# Patient Record
Sex: MALE | Race: WHITE | NOT HISPANIC OR LATINO | Employment: OTHER | URBAN - METROPOLITAN AREA
[De-identification: names, ages, dates, MRNs, and addresses within clinical notes are randomized per-mention and may not be internally consistent; named-entity substitution may affect disease eponyms.]

---

## 2020-11-20 ENCOUNTER — TRANSCRIBE ORDERS (OUTPATIENT)
Dept: ADMINISTRATIVE | Facility: HOSPITAL | Age: 65
End: 2020-11-20

## 2020-11-20 DIAGNOSIS — K21.9 GASTRO-ESOPHAGEAL REFLUX DISEASE WITHOUT ESOPHAGITIS: ICD-10-CM

## 2020-11-20 DIAGNOSIS — R49.9 UNSPECIFIED VOICE AND RESONANCE DISORDER: Primary | ICD-10-CM

## 2020-11-30 ENCOUNTER — HOSPITAL ENCOUNTER (OUTPATIENT)
Dept: RADIOLOGY | Facility: HOSPITAL | Age: 65
Discharge: HOME | End: 2020-11-30
Attending: SPECIALIST
Payer: MEDICARE

## 2020-11-30 DIAGNOSIS — R49.9 UNSPECIFIED VOICE AND RESONANCE DISORDER: ICD-10-CM

## 2020-11-30 DIAGNOSIS — K21.9 GASTRO-ESOPHAGEAL REFLUX DISEASE WITHOUT ESOPHAGITIS: ICD-10-CM

## 2020-11-30 PROCEDURE — 74220 X-RAY XM ESOPHAGUS 1CNTRST: CPT

## 2021-03-04 DIAGNOSIS — Z23 ENCOUNTER FOR IMMUNIZATION: ICD-10-CM

## 2021-07-08 ENCOUNTER — TELEPHONE (OUTPATIENT)
Dept: CT IMAGING | Facility: HOSPITAL | Age: 66
End: 2021-07-08

## 2021-07-08 ENCOUNTER — HOSPITAL ENCOUNTER (EMERGENCY)
Facility: HOSPITAL | Age: 66
Discharge: HOME | End: 2021-07-08
Attending: EMERGENCY MEDICINE | Admitting: EMERGENCY MEDICINE
Payer: MEDICARE

## 2021-07-08 ENCOUNTER — APPOINTMENT (EMERGENCY)
Dept: CT IMAGING | Facility: HOSPITAL | Age: 66
End: 2021-07-08
Payer: MEDICARE

## 2021-07-08 VITALS
OXYGEN SATURATION: 98 % | HEART RATE: 62 BPM | DIASTOLIC BLOOD PRESSURE: 80 MMHG | TEMPERATURE: 98 F | SYSTOLIC BLOOD PRESSURE: 160 MMHG | RESPIRATION RATE: 18 BRPM

## 2021-07-08 DIAGNOSIS — S01.81XA FACIAL LACERATION, INITIAL ENCOUNTER: ICD-10-CM

## 2021-07-08 DIAGNOSIS — S05.12XA PERIORBITAL CONTUSION OF LEFT EYE, INITIAL ENCOUNTER: Primary | ICD-10-CM

## 2021-07-08 PROCEDURE — G1004 CDSM NDSC: HCPCS

## 2021-07-08 PROCEDURE — 99282 EMERGENCY DEPT VISIT SF MDM: CPT | Performed by: PHYSICIAN ASSISTANT

## 2021-07-08 PROCEDURE — 12013 RPR F/E/E/N/L/M 2.6-5.0 CM: CPT | Performed by: PHYSICIAN ASSISTANT

## 2021-07-08 PROCEDURE — 99284 EMERGENCY DEPT VISIT MOD MDM: CPT

## 2021-07-08 PROCEDURE — 70486 CT MAXILLOFACIAL W/O DYE: CPT

## 2021-07-08 RX ORDER — LIDOCAINE HYDROCHLORIDE AND EPINEPHRINE 10; 10 MG/ML; UG/ML
10 INJECTION, SOLUTION INFILTRATION; PERINEURAL ONCE
Status: COMPLETED | OUTPATIENT
Start: 2021-07-08 | End: 2021-07-08

## 2021-07-08 RX ADMIN — LIDOCAINE HYDROCHLORIDE,EPINEPHRINE BITARTRATE 10 ML: 10; .01 INJECTION, SOLUTION INFILTRATION; PERINEURAL at 18:43

## 2021-07-08 NOTE — ED PROVIDER NOTES
History  Chief Complaint   Patient presents with    Fall     pt slipped in shower this morning hitting left eyebrow on vanity  denies LOC  does not take asa or BT     Yessi Gonsalves is a 76 y o  male who presents to the ED with complaints of facial laceration  Patient was out of town this morning when he slipped in the shower and hit his left forehead off of a piece of furniture at 0400 this morning  Patient states he had a catch a flight so he applied peroxide, a Band-Aid and Neosporin  Patient states during the injury he felt like one of his left upper teeth came loose  Patient states he had a recent root canal so he called his dentist  Patient admits to mild left-sided headache  Denies LOC, nausea, vomiting, neck pain, neck stiffness, numbness, tingling, weakness, visual changes, pain with extraocular movement, diplopia, epistaxis, chest pain, shortness of breath, fever, chills  History provided by:  Patient  Fall  Mechanism of injury: fall    Injury location:  Head/neck  Head/neck injury location:  Head  Time since incident:  14 hours  Fall:     Fall occurred:  Tripped    Impact surface:  Furniture  Associated symptoms: headaches    Associated symptoms: no abdominal pain, no back pain, no blindness, no chest pain, no nausea, no neck pain, no seizures and no vomiting        None       History reviewed  No pertinent past medical history  History reviewed  No pertinent surgical history  History reviewed  No pertinent family history  I have reviewed and agree with the history as documented  E-Cigarette/Vaping     E-Cigarette/Vaping Substances     Social History     Tobacco Use    Smoking status: Never Smoker    Smokeless tobacco: Never Used   Substance Use Topics    Alcohol use: Yes     Comment: 3 glasses of wine a day    Drug use: Not Currently       Review of Systems   Constitutional: Negative for appetite change, chills, fever and unexpected weight change     HENT: Negative for congestion, drooling, ear pain, rhinorrhea, sore throat, trouble swallowing and voice change  Eyes: Negative for blindness, pain, discharge, redness and visual disturbance  Respiratory: Negative for cough, shortness of breath, wheezing and stridor  Cardiovascular: Negative for chest pain, palpitations and leg swelling  Gastrointestinal: Negative for abdominal pain, blood in stool, constipation, diarrhea, nausea and vomiting  Genitourinary: Negative for dysuria, flank pain, frequency, hematuria and urgency  Musculoskeletal: Negative for back pain, gait problem, joint swelling, neck pain and neck stiffness  Skin: Positive for wound  Negative for color change and rash  Neurological: Positive for headaches  Negative for dizziness, seizures and light-headedness  Physical Exam  Physical Exam  Vitals and nursing note reviewed  Constitutional:       Appearance: He is well-developed  HENT:      Head: Normocephalic and atraumatic  Comments: Left periorbital contusion  3 cm laceration overlying the left forehead/eyebrow  No appreciable foreign body during irrigation or expiration  No skull depression  Nose: Nose normal    Eyes:      Conjunctiva/sclera: Conjunctivae normal       Pupils: Pupils are equal, round, and reactive to light  Cardiovascular:      Rate and Rhythm: Normal rate and regular rhythm  Pulmonary:      Effort: Pulmonary effort is normal       Breath sounds: Normal breath sounds  Musculoskeletal:         General: Normal range of motion  Cervical back: Normal range of motion and neck supple  Skin:     General: Skin is warm and dry  Capillary Refill: Capillary refill takes less than 2 seconds  Neurological:      Mental Status: He is alert and oriented to person, place, and time           Vital Signs  ED Triage Vitals   Temperature Pulse Respirations Blood Pressure SpO2   07/08/21 1706 07/08/21 1704 07/08/21 1704 07/08/21 1705 07/08/21 1704   98 1 °F (36 7 °C) 60 18 (!) 210/109 98 %      Temp Source Heart Rate Source Patient Position - Orthostatic VS BP Location FiO2 (%)   07/08/21 1706 07/08/21 1704 07/08/21 1922 -- --   Oral Monitor Lying        Pain Score       --                  Vitals:    07/08/21 1704 07/08/21 1705 07/08/21 1855 07/08/21 1922   BP:  (!) 210/109 160/80 160/80   Pulse: 60  62 62   Patient Position - Orthostatic VS:    Lying         Visual Acuity  Visual Acuity      Most Recent Value   L Pupil Size (mm)  3   R Pupil Size (mm)  3          ED Medications  Medications   lidocaine-epinephrine (XYLOCAINE/EPINEPHRINE) 1 %-1:100,000 injection 10 mL (10 mL Infiltration Given 7/8/21 1843)       Diagnostic Studies  Results Reviewed     None                 CT facial bones without contrast   Final Result by Oliver Cottrell MD (07/08 2008)         1  No evidence of acute maxillofacial fracture  2   Left periorbital soft tissue contusion  No retrobulbar hematoma  Workstation performed: [id number]                    Procedures  Laceration repair    Date/Time: 7/8/2021 8:32 PM  Performed by: Gabriel Valdez MD  Authorized by: Amy Gutierrez DO   Consent given by: patient  Body area: head/neck  Location details: forehead  Laceration length: 3 cm    Anesthesia:  Local Anesthetic: lidocaine 1% with epinephrine  Anesthetic total: 5 mL    Wound Dehiscence:  Superficial Wound Dehiscence: simple closure      Procedure Details:  Preparation: Patient was prepped and draped in the usual sterile fashion    Irrigation solution: saline  Irrigation method: jet lavage  Amount of cleaning: standard  Skin closure: 5-0 nylon  Number of sutures: 5  Technique: buried suture  Approximation: close  Approximation difficulty: simple  Patient tolerance: patient tolerated the procedure well with no immediate complications  Comments: 4 simple interrupted, 1 figure of eight                ED Course  ED Course as of Jul 08 2044   Thu Jul 08, 2021 2029 Educated patient regarding diagnosis and management  Advised patient to follow up with PCP  Advised patient to RTER for persistent or worsening symptoms  SBIRT 22yo+      Most Recent Value   SBIRT (24 yo +)   In order to provide better care to our patients, we are screening all of our patients for alcohol and drug use  Would it be okay to ask you these screening questions? Unable to answer at this time Filed at: 07/08/2021 1922                    [facility]  Number of Diagnoses or Management Options  Facial laceration, initial encounter: new and requires workup  Periorbital contusion of left eye, initial encounter: new and requires workup  Diagnosis management comments: CT facial bones without acute maxillofacial fracture but significant for left periorbital soft tissue contusion  Patient tolerated wound irrigation and closure  Patient was instructed on proper wound care  Patient will follow up in 5-7 days for suture removal  I provided patient with strict RTER precautions  I advised patient follow-up with PCP in 24-48 hours  Patient verbalized understanding  Amount and/or Complexity of Data Reviewed  Tests in the radiology section of CPT®: ordered and reviewed  Review and summarize past medical records: yes    Patient Progress  Patient progress: stable      Disposition  Final diagnoses:   Periorbital contusion of left eye, initial encounter   Facial laceration, initial encounter     Time reflects when diagnosis was documented in both MDM as applicable and the Disposition within this note     Time User Action Codes Description Comment    7/8/2021  8:30  [address], [address] [[id number]] Periorbital contusion of left eye, initial encounter     7/8/2021  8:31 PM Lizzie Quinonez Add [[id number]] Facial laceration, initial encounter       ED Disposition     ED Disposition Condition Date/Time Comment    Discharge Stable Thu Jul 8, 2021  8:31 PM Carl Miller discharge to home/self care  Follow-up Information     Follow up With Specialties Details Why Contact Info Additional [address] Emergency Department Emergency Medicine Go to  For suture removal in 5-7 days [address] [facility] [address] [facility] Emergency Department, [address], [place], [place], [address] [facility] to  For suture removal in 5-7 days [address] [facility]  [phone]             There are no discharge medications for this patient  No discharge procedures on file      PDMP Review     None          ED Provider  Electronically Signed by           Lida Denise PA-C  07/08/21 4663

## 2021-07-09 NOTE — DISCHARGE INSTRUCTIONS
Laceration   WHAT YOU NEED TO KNOW:   A laceration is an injury to the skin and the soft tissue underneath it  Lacerations can happen anywhere on the body  DISCHARGE INSTRUCTIONS:   Return to the emergency department if:   · You have heavy bleeding or bleeding that does not stop after 10 minutes of holding firm, direct pressure over the wound  · Your wound opens up  Call your doctor if:   · You have a fever or chills  · Your laceration is red, warm, or swollen  · You have red streaks on your skin coming from your wound  · You have white or yellow drainage from the wound that smells bad  · You have pain that gets worse, even after treatment  · You have questions or concerns about your condition or care  Medicines: You may need any of the following:  · Prescription pain medicine  may be given  Ask your healthcare provider how to take this medicine safely  Some prescription pain medicines contain acetaminophen  Do not take other medicines that contain acetaminophen without talking to your healthcare provider  Too much acetaminophen may cause liver damage  Prescription pain medicine may cause constipation  Ask your healthcare provider how to prevent or treat constipation  · Antibiotics  help treat or prevent a bacterial infection  · Take your medicine as directed  Contact your healthcare provider if you think your medicine is not helping or if you have side effects  Tell him or her if you are allergic to any medicine  Keep a list of the medicines, vitamins, and herbs you take  Include the amounts, and when and why you take them  Bring the list or the pill bottles to follow-up visits  Carry your medicine list with you in case of an emergency  Care for your wound as directed:   · Do not get your wound wet  until your healthcare provider says it is okay  Do not soak your wound in water  Do not go swimming until your healthcare provider says it is okay   Carefully wash the wound with soap and water  Gently pat the area dry or allow it to air dry  · Change your bandages  when they get wet, dirty, or after washing  Apply new, clean bandages as directed  Do not apply elastic bandages or tape too tight  Do not put powders or lotions over your incision  · Apply antibiotic ointment as directed  Your healthcare provider may give you antibiotic ointment to put over your wound if you have stitches  If you have strips of tape over your incision, let them dry up and fall off on their own  If they do not fall off within 14 days, gently remove them  If you have glue over your wound, do not remove or pick at it  If your glue comes off, do not replace it with glue that you have at home  · Check your wound every day for signs of infection, such as swelling, redness, or pus  Self-care:   · Apply ice  on your wound for 15 to 20 minutes every hour or as directed  Use an ice pack, or put crushed ice in a plastic bag  Cover it with a towel  Ice helps prevent tissue damage and decreases swelling and pain  · Use a splint as directed  A splint will decrease movement and stress on your wound  It may help it heal faster  A splint may be used for lacerations over joints or areas of your body that bend  Ask your healthcare provider how to apply and remove a splint  · Decrease scarring of your wound  by applying ointments as directed  Do not apply ointments until your healthcare provider says it is okay  You may need to wait until your wound is healed  Ask which ointment to buy and how often to use it  After your wound is healed, use sunscreen over the area when you are out in the sun  You should do this for at least 6 months to 1 year after your injury  Follow up with your doctor as directed: You may need to follow up in 24 to 48 hours to have your wound checked for infection  You will need to return in 3 to 14 days if you have stitches or staples so they can be removed   Care for your wound as directed to prevent infection and help it heal  Write down your questions so you remember to ask them during your visits  © Copyright [address] Information is for End User's use only and may not be sold, redistributed or otherwise used for commercial purposes  All illustrations and images included in CareNotes® are the copyrighted property of [employer]  or Reynaldo Silver[employer]  The above information is an  only  It is not intended as medical advice for individual conditions or treatments  Talk to your doctor, nurse or pharmacist before following any medical regimen to see if it is safe and effective for you  Care For Your Stitches   WHAT YOU NEED TO KNOW:   Stitches, or sutures, are used to close cuts and wounds on the skin  Stitches need to be removed after your wound has healed  DISCHARGE INSTRUCTIONS:   Return to the emergency department if:   · Your stitches come apart  · Blood soaks through your bandages  · You suddenly cannot move your injured joint  · You have sudden numbness around your wound  · You see red streaks coming from your wound  Contact your healthcare provider if:   · You have a fever and chills  · Your wound is red, warm, swollen, or leaking pus  · There is a bad smell coming from your wound  · You have increased pain in the wound area  · You have questions or concerns about your condition or care  Care for your stitches:   · Protect the stitches  You may need to cover your stitches with a bandage for 24 to 48 hours, or as directed  Do not bump or hit the suture area  This could open the wound  Do not trim or shorten the ends of your stitches  If they rub on your clothing, put a gauze bandage between the stitches and your clothes  · Clean the area as directed  Carefully wash your wound with soap and water  For mouth and lip wounds, rinse your mouth after meals and at bedtime   Ask your healthcare provider what to use to rinse your mouth  If you have a scalp wound, you may gently wash your hair every 2 days with mild shampoo  Do not use hair products, such as hair spray  Check your wound for signs of infection when you clean it  Signs include redness, swelling, and pus  · Keep the area dry as directed  Wait 12 to 24 hours after you receive your stitches before you take a shower  Take showers instead of baths  Do not take a bath or swim  Your healthcare provider will give you instructions for bathing with your stitches  Help your wound heal:   · Elevate your wound  Keep your wound above the level of your heart as often as you can  This will help decrease swelling and pain  Prop the area on pillows or blankets, if possible, to keep it elevated comfortably  · Limit activity  Do not stretch the skin around your wound  This will help prevent bleeding and swelling  Follow up with your healthcare provider as directed: You may need to return to have your stitches removed  Write down your questions so you remember to ask them during your visits  © Copyright [address] Information is for End User's use only and may not be sold, redistributed or otherwise used for commercial purposes  All illustrations and images included in CareNotes® are the copyrighted property of [employer] A M [employer] Inc  or [address] [place], [place] [address]ger   The above information is an  only  It is not intended as medical advice for individual conditions or treatments  Talk to your doctor, nurse or pharmacist before following any medical regimen to see if it is safe and effective for you

## 2023-08-16 ENCOUNTER — APPOINTMENT (OUTPATIENT)
Dept: RADIOLOGY | Facility: CLINIC | Age: 68
End: 2023-08-16
Payer: MEDICARE

## 2023-08-16 VITALS — SYSTOLIC BLOOD PRESSURE: 118 MMHG | DIASTOLIC BLOOD PRESSURE: 78 MMHG

## 2023-08-16 DIAGNOSIS — M25.521 PAIN IN RIGHT ELBOW: ICD-10-CM

## 2023-08-16 DIAGNOSIS — M25.521 PAIN IN RIGHT ELBOW: Primary | ICD-10-CM

## 2023-08-16 PROCEDURE — 73080 X-RAY EXAM OF ELBOW: CPT

## 2023-08-16 PROCEDURE — 99204 OFFICE O/P NEW MOD 45 MIN: CPT | Performed by: FAMILY MEDICINE

## 2023-08-16 RX ORDER — FINASTERIDE 5 MG/1
TABLET, FILM COATED ORAL
COMMUNITY
Start: 2023-06-19

## 2023-08-16 RX ORDER — LISINOPRIL 10 MG/1
TABLET ORAL
COMMUNITY
Start: 2023-08-15

## 2023-08-16 NOTE — PROGRESS NOTES
1. Pain in right elbow  XR elbow 3+ vw right    CT upper extremity wo contrast right    [facility]        Orders Placed This Encounter   Procedures   • XR elbow 3+ vw right   • CT upper extremity wo contrast right   • [facility]        IMAGING STUDIES: (I personally reviewed images in PACS and report):  xray right elbow 8/16/23: Moderate OA. 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 Patient instructions below verbally summarized in person during encounter: There are no Patient Instructions on file for this visit.      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 Currently deep seated pain. Denies any pain of epicondyles. Developed new onset stiffness of right elbow. Review of Systems      Following history reviewed and update:    History reviewed. No pertinent past medical history. History reviewed. No pertinent surgical history. Social History   Social History     Substance and Sexual Activity   Alcohol Use Yes    Comment: 3 glasses of wine a day     Social History     Substance and Sexual Activity   Drug Use Not Currently     Social History     Tobacco Use   Smoking Status Never   Smokeless Tobacco Never     History reviewed. No pertinent family history.   No Known Allergies       Physical Exam  /78     Constitutional:  see vital signs  Gen: well-developed, normocephalic/atraumatic, well-groomed  Eyes: No inflammation or discharge of conjunctiva or lids; sclera clear   Pharynx: no inflammation, lesion, or mass of lips  Neck: supple, no masses, non-distended  MSK: no inflammation, lesion, mass, or clubbing of nails and digits except for other than mentioned below  SKIN: no visible rashes or skin lesions  Pulmonary/Chest: Effort normal. No respiratory distress.    NEURO: cranial nerves grossly intact  PSYCH:  Alert and oriented to person, place, and time; recent and remote memory intact; mood normal, no depression, anxiety, or agitation, judgment and insight good and intact     Ortho Exam  Right Elbow:  no swelling, erythema, or increased warmth  rom full flexion; minus 20-30 extension  nontender  no laxity of joint  Cubital tunnel Tinel's test: negative  Distal Biceps Hook test: normal    Right Wrist  no swelling, erythema, or increased warmth  rom full  nontender  no laxity of joint; druj stable    Right Hand  no erythema  Swelling: none  Tenderness: none  rom fingers mcp, pip, dip intact without pain  No digital scissoring or deviation of fingers  no extensor lag  no rotation of fingers  no joint laxity  strenght flexion and extension mcp, pip, dip 5/5  sensation intact  capillary refill intact   Froment sign:  normal  OK sign:  Normal  Thumb extension:  5/5    __________________________________________________________________________  Procedures

## 2023-08-23 ENCOUNTER — HOSPITAL ENCOUNTER (OUTPATIENT)
Dept: CT IMAGING | Facility: HOSPITAL | Age: 68
Discharge: HOME | End: 2023-08-23
Attending: FAMILY MEDICINE
Payer: MEDICARE

## 2023-08-23 DIAGNOSIS — M25.521 PAIN IN RIGHT ELBOW: ICD-10-CM

## 2023-08-23 PROCEDURE — G1004 CDSM NDSC: HCPCS

## 2023-08-23 PROCEDURE — 73200 CT UPPER EXTREMITY W/O DYE: CPT

## 2023-08-31 ENCOUNTER — APPOINTMENT (OUTPATIENT)
Dept: RADIOLOGY | Facility: OTHER | Age: 68
End: 2023-08-31
Payer: MEDICARE

## 2023-08-31 VITALS — DIASTOLIC BLOOD PRESSURE: 90 MMHG | SYSTOLIC BLOOD PRESSURE: 149 MMHG | HEART RATE: 54 BPM | WEIGHT: 216 LBS

## 2023-08-31 DIAGNOSIS — G89.29 CHRONIC PAIN OF BOTH KNEES: ICD-10-CM

## 2023-08-31 DIAGNOSIS — M25.561 CHRONIC PAIN OF BOTH KNEES: ICD-10-CM

## 2023-08-31 DIAGNOSIS — M25.562 CHRONIC PAIN OF BOTH KNEES: ICD-10-CM

## 2023-08-31 DIAGNOSIS — M19.021 PRIMARY OSTEOARTHRITIS OF RIGHT ELBOW: Primary | ICD-10-CM

## 2023-08-31 PROCEDURE — 73562 X-RAY EXAM OF KNEE 3: CPT

## 2023-08-31 RX ORDER — BUPIVACAINE HYDROCHLORIDE 2.5 MG/ML
4 INJECTION, SOLUTION INFILTRATION; PERINEURAL
Status: COMPLETED | OUTPATIENT
Start: 2023-08-31 | End: 2023-08-31

## 2023-08-31 RX ORDER — TRIAMCINOLONE ACETONIDE 40 MG/ML
40 INJECTION, SUSPENSION INTRA-ARTICULAR; INTRAMUSCULAR
Status: COMPLETED | OUTPATIENT
Start: 2023-08-31 | End: 2023-08-31

## 2023-08-31 RX ORDER — LISINOPRIL 10 MG/1
1 TABLET ORAL DAILY
COMMUNITY
Start: 2022-10-01 | End: 2023-09-05 | Stop reason: SDUPTHER

## 2023-08-31 RX ADMIN — TRIAMCINOLONE ACETONIDE 40 MG: 40 INJECTION, SUSPENSION INTRA-ARTICULAR; INTRAMUSCULAR at 08:15

## 2023-08-31 RX ADMIN — BUPIVACAINE HYDROCHLORIDE 4 ML: 2.5 INJECTION, SOLUTION INFILTRATION; PERINEURAL at 08:15

## 2023-08-31 NOTE — PROGRESS NOTES
1. Primary osteoarthritis of right elbow    2. Chronic pain of both knees        Orders Placed This Encounter   Procedures   • Large joint arthrocentesis: R knee   • Large joint arthrocentesis: L knee   • XR knee 3 vw right non injury   • XR knee 3 vw left non injury   • Ambulatory Referral to Occupational Therapy   • Ambulatory Referral to Physical Therapy        IMAGING STUDIES: (I personally reviewed images in PACS and report):    XR bilateral knee 8/31/2023  Mild-moderate lateral compartment OA  No acute fracture or dislocation       PAST REPORTS:    CT right upper extremity 8/23/2023  There is moderate elbow osteoarthritis  There is a punctate calcified intra-articular body located between the ulna and trochlea  There are also several small calcified intra-articular bodies in the olecranon fossa  There is a small joint effusion    XR right elbow dated 8/16/2023  Moderate OA  Enthesophytes at the medial and lateral epicondyles at the common flexor and extensor tendon origins, likely sequela of chronic medial and lateral epicondylitis. PAST PROCEDURES:      ASSESSMENT/PLAN:  Right Elbow Stiffness with flexion contracture due to OA. Mild-moderate OA of bilateral knee      Repeat X-ray next visit: None    Return for Follow-up for Ultrasound Guided Injection at [facility]. Patient instructions below verbally summarized in person during encounter:  Patient Instructions   red flags and risks of injection include but are not limited to infection <0.072% as referenced in some sources, nerve or artery penetration, and if steroids are used-skin dimpling <1%, hypo-pigmentation <1%. Recommended no submerging underwater in a tub, pool, ocean, lake, jacuzzi, hot tub, or any other body of water for 1 week until needle wound closes due to risk of infection. May take showers. Clean needle site with soap and water and keep covered at all times with sterile bandage such as a band-aid until fully healed. Educated if any symptoms including fevers, chills, swelling, or worsening symptoms occur then to call office or go to hospital for immediate care if physician unavailable due to possible infection or other complication which is a serious medical problem. Patient expressed understanding and agreed to proceed with procedure.          __________________________________________________________________________    HISTORY OF PRESENT ILLNESS:    21-year-old male presents for 2 weeks follow-up of non-traumatic right elbow pain. PMHX chronic intermittent medial and lateral epicondylitis of right elbow associated with playing tennis. Also fell 1 year ago with right shoulder and elbow pain. Last visit 8/16/2023 with noted right elbow stiffness with flexion contracture due to OA. Today patient reports his pain has improved approx. 50% since last visit. Still endorses pain deep seated posterior elbow pain. Pain is 7/10 at worst, localized, dull/achy, worse with extreme of ROM, improved with rest. Stiffness has been stable. Also reports chronic intermittent bilateral lateral knee pain. Denies any injury or trauma. Review of Systems      Following history reviewed and update:    History reviewed. No pertinent past medical history. History reviewed. No pertinent surgical history. Social History   Social History     Substance and Sexual Activity   Alcohol Use Yes    Comment: 3 glasses of wine a day     Social History     Substance and Sexual Activity   Drug Use Not Currently     Social History     Tobacco Use   Smoking Status Never   Smokeless Tobacco Never     History reviewed. No pertinent family history.   No Known Allergies       Physical Exam  /90   Pulse (!) 54   Wt 98 kg (216 lb)     Constitutional:  see vital signs  Gen: well-developed, normocephalic/atraumatic, well-groomed  Eyes: No inflammation or discharge of conjunctiva or lids; sclera clear   Pharynx: no inflammation, lesion, or mass of lips  Neck: supple, no masses, non-distended  MSK: no inflammation, lesion, mass, or clubbing of nails and digits except for other than mentioned below  SKIN: no visible rashes or skin lesions  Pulmonary/Chest: Effort normal. No respiratory distress. NEURO: cranial nerves grossly intact  PSYCH:  Alert and oriented to person, place, and time; recent and remote memory intact; mood normal, no depression, anxiety, or agitation, judgment and insight good and intact     Ortho Exam      Right Elbow:  no swelling, erythema, or increased warmth  rom full flexion; minus 20-30 extension  nontender  no laxity of joint  Cubital tunnel Tinel's test: negative  Distal Biceps Hook test: normal     Right Wrist  no swelling, erythema, or increased warmth  rom full  nontender  no laxity of joint; druj stable     Right Hand  no erythema  Swelling: none  Tenderness: none  rom fingers mcp, pip, dip intact without pain  No digital scissoring or deviation of fingers  no extensor lag  no rotation of fingers  no joint laxity  strenght flexion and extension mcp, pip, dip 5/5  sensation intact  capillary refill intact   Froment sign:  normal  OK sign:  Normal  Thumb extension:  5/5      __________________________________________________________________________  Large joint arthrocentesis: R knee  [place] Protocol:  Procedure performed by:  Consent: Verbal consent obtained. Risks and benefits: risks, benefits and alternatives were discussed  Consent given by: patient  Patient understanding: patient states understanding of the procedure being performed  Patient consent: the patient's understanding of the procedure matches consent given  Site marked: the operative site was marked  Radiology Images displayed and confirmed.  If images not available, report reviewed: imaging studies available  Required items: required blood products, implants, devices, and special equipment available  Patient identity confirmed: verbally with patient    Supporting Documentation  Indications: pain   Procedure Details  Location: knee - R knee  Preparation: Patient was prepped and draped in the usual sterile fashion  Needle size: 22 G (22G-1.5in needle)  Ultrasound guidance: no  Approach: anterolateral  Medications administered: 4 mL bupivacaine 0.25 %; 40 mg triamcinolone acetonide 40 mg/mL    Patient tolerance: patient tolerated the procedure well with no immediate complications  Dressing:  Sterile dressing applied    Large joint arthrocentesis: L knee  Universal Protocol:  Procedure performed by:  Consent: Verbal consent obtained. Risks and benefits: risks, benefits and alternatives were discussed  Consent given by: patient  Patient understanding: patient states understanding of the procedure being performed  Patient consent: the patient's understanding of the procedure matches consent given  Site marked: the operative site was marked  Radiology Images displayed and confirmed.  If images not available, report reviewed: imaging studies available  Required items: required blood products, implants, devices, and special equipment available  Patient identity confirmed: verbally with patient    Supporting Documentation  Indications: pain   Procedure Details  Location: knee - L knee  Preparation: Patient was prepped and draped in the usual sterile fashion  Needle size: 22 G (22G-1.5in needle)  Ultrasound guidance: no  Approach: anterolateral  Medications administered: 4 mL bupivacaine 0.25 %; 40 mg triamcinolone acetonide 40 mg/mL    Patient tolerance: patient tolerated the procedure well with no immediate complications  Dressing:  Sterile dressing applied

## 2023-09-05 ENCOUNTER — OFFICE VISIT (OUTPATIENT)
Dept: FAMILY MEDICINE CLINIC | Facility: CLINIC | Age: 68
End: 2023-09-05
Payer: MEDICARE

## 2023-09-05 ENCOUNTER — PATIENT MESSAGE (OUTPATIENT)
Dept: FAMILY MEDICINE CLINIC | Facility: CLINIC | Age: 68
End: 2023-09-05

## 2023-09-05 ENCOUNTER — LAB (OUTPATIENT)
Dept: LAB | Facility: CLINIC | Age: 68
End: 2023-09-05
Payer: MEDICARE

## 2023-09-05 VITALS
HEART RATE: 64 BPM | WEIGHT: 212 LBS | RESPIRATION RATE: 18 BRPM | DIASTOLIC BLOOD PRESSURE: 84 MMHG | HEIGHT: 71 IN | BODY MASS INDEX: 29.68 KG/M2 | OXYGEN SATURATION: 99 % | TEMPERATURE: 97.6 F | SYSTOLIC BLOOD PRESSURE: 150 MMHG

## 2023-09-05 DIAGNOSIS — Z00.00 MEDICARE ANNUAL WELLNESS VISIT, SUBSEQUENT: Primary | ICD-10-CM

## 2023-09-05 DIAGNOSIS — I10 PRIMARY HYPERTENSION: ICD-10-CM

## 2023-09-05 DIAGNOSIS — L65.9 HAIR LOSS: ICD-10-CM

## 2023-09-05 DIAGNOSIS — I10 PRIMARY HYPERTENSION: Primary | ICD-10-CM

## 2023-09-05 LAB
ANION GAP SERPL CALCULATED.3IONS-SCNC: 13 MMOL/L
BUN SERPL-MCNC: 19 MG/DL (ref 5–25)
CALCIUM SERPL-MCNC: 9.2 MG/DL (ref 8.4–10.2)
CHLORIDE SERPL-SCNC: 100 MMOL/L (ref 96–108)
CHOLEST SERPL-MCNC: 229 MG/DL
CO2 SERPL-SCNC: 25 MMOL/L (ref 21–32)
CREAT SERPL-MCNC: 1.19 MG/DL (ref 0.6–1.3)
GFR SERPL CREATININE-BSD FRML MDRD: 61 ML/MIN/1.73SQ M
GLUCOSE P FAST SERPL-MCNC: 98 MG/DL (ref 65–99)
HDLC SERPL-MCNC: 61 MG/DL
LDLC SERPL CALC-MCNC: 131 MG/DL (ref 0–100)
NONHDLC SERPL-MCNC: 168 MG/DL
POTASSIUM SERPL-SCNC: 4.4 MMOL/L (ref 3.5–5.3)
SODIUM SERPL-SCNC: 138 MMOL/L (ref 135–147)
TRIGL SERPL-MCNC: 184 MG/DL

## 2023-09-05 PROCEDURE — 99203 OFFICE O/P NEW LOW 30 MIN: CPT | Performed by: FAMILY MEDICINE

## 2023-09-05 PROCEDURE — 36415 COLL VENOUS BLD VENIPUNCTURE: CPT

## 2023-09-05 PROCEDURE — 80061 LIPID PANEL: CPT

## 2023-09-05 PROCEDURE — G0438 PPPS, INITIAL VISIT: HCPCS | Performed by: FAMILY MEDICINE

## 2023-09-05 PROCEDURE — 80048 BASIC METABOLIC PNL TOTAL CA: CPT

## 2023-09-05 RX ORDER — LISINOPRIL 10 MG/1
15 TABLET ORAL DAILY
Qty: 135 TABLET | Refills: 3 | Status: SHIPPED | OUTPATIENT
Start: 2023-09-05 | End: 2023-09-11

## 2023-09-05 RX ORDER — FINASTERIDE 5 MG/1
5 TABLET, FILM COATED ORAL DAILY
Qty: 90 TABLET | Refills: 3 | Status: SHIPPED | OUTPATIENT
Start: 2023-09-05

## 2023-09-05 NOTE — PATIENT INSTRUCTIONS
Medicare wellness exam was completed. Blood pressure is a bit high. Increase lisinopril to 15 mg daily. Check lipid profile, blood sugar and kidney function. Handout given about pros and cons of screening for prostate cancer from the choosing wisely program.  Vaccinations are up-to-date. Recommend the latest COVID vaccine when it comes out along with this years flu shot sometime in October. He was asked to call with the name of his colonoscopy doctor so we can get the information of his last colonoscopy. Could also make up some dates for vaccinations that can be placed in his medical record. Recheck 1 year or as needed. Medicare Preventive Visit Patient Instructions  Thank you for completing your Welcome to Medicare Visit or Medicare Annual Wellness Visit today. Your next wellness visit will be due in one year (9/5/2024). The screening/preventive services that you may require over the next 5-10 years are detailed below. Some tests may not apply to you based off risk factors and/or age. Screening tests ordered at today's visit but not completed yet may show as past due. Also, please note that scanned in results may not display below. Preventive Screenings:  Service Recommendations Previous Testing/Comments   Colorectal Cancer Screening  Colonoscopy    Fecal Occult Blood Test (FOBT)/Fecal Immunochemical Test (FIT)  Fecal DNA/Cologuard Test  Flexible Sigmoidoscopy Age: 43-73 years old   Colonoscopy: every 10 years (May be performed more frequently if at higher risk)  OR  FOBT/FIT: every 1 year  OR  Cologuard: every 3 years  OR  Sigmoidoscopy: every 5 years  Screening may be recommended earlier than age 39 if at higher risk for colorectal cancer. Also, an individualized decision between you and your healthcare provider will decide whether screening between the ages of 77-80 would be appropriate.  Colonoscopy: Not on file  FOBT/FIT: Not on file  Cologuard: Not on file  Sigmoidoscopy: Not on file Prostate Cancer Screening Individualized decision between patient and health care provider in men between ages of 53-66   Medicare will cover every 12 months beginning on the day after your 50th birthday PSA: No results in last 5 years           Hepatitis C Screening Once for adults born between 80 and 1965  More frequently in patients at high risk for Hepatitis C Hep C Antibody: Not on file        Diabetes Screening 1-2 times per year if you're at risk for diabetes or have pre-diabetes Fasting glucose: No results in last 5 years (No results in last 5 years)  A1C: No results in last 5 years (No results in last 5 years)      Cholesterol Screening Once every 5 years if you don't have a lipid disorder. May order more often based on risk factors. Lipid panel: Not on file         Other Preventive Screenings Covered by Medicare:  Abdominal Aortic Aneurysm (AAA) Screening: covered once if your at risk. You're considered to be at risk if you have a family history of AAA or a male between the age of 70-76 who smoking at least 100 cigarettes in your lifetime. Lung Cancer Screening: covers low dose CT scan once per year if you meet all of the following conditions: (1) Age 48-67; (2) No signs or symptoms of lung cancer; (3) Current smoker or have quit smoking within the last 15 years; (4) You have a tobacco smoking history of at least 20 pack years (packs per day x number of years you smoked); (5) You get a written order from a healthcare provider.   Glaucoma Screening: covered annually if you're considered high risk: (1) You have diabetes OR (2) Family history of glaucoma OR (3)  aged 48 and older OR (3)  American aged 72 and older  Osteoporosis Screening: covered every 2 years if you meet one of the following conditions: (1) Have a vertebral abnormality; (2) On glucocorticoid therapy for more than 3 months; (3) Have primary hyperparathyroidism; (4) On osteoporosis medications and need to assess response to drug therapy. HIV Screening: covered annually if you're between the age of 14-79. Also covered annually if you are younger than 13 and older than 72 with risk factors for HIV infection. For pregnant patients, it is covered up to 3 times per pregnancy. Immunizations:  Immunization Recommendations   Influenza Vaccine Annual influenza vaccination during flu season is recommended for all persons aged >= 6 months who do not have contraindications   Pneumococcal Vaccine   * Pneumococcal conjugate vaccine = PCV13 (Prevnar 13), PCV15 (Vaxneuvance), PCV20 (Prevnar 20)  * Pneumococcal polysaccharide vaccine = PPSV23 (Pneumovax) Adults 20-63 years old: 1-3 doses may be recommended based on certain risk factors  Adults 72 years old: 1-2 doses may be recommended based off what pneumonia vaccine you previously received   Hepatitis B Vaccine 3 dose series if at intermediate or high risk (ex: diabetes, end stage renal disease, liver disease)   Tetanus (Td) Vaccine - COST NOT COVERED BY MEDICARE PART B Following completion of primary series, a booster dose should be given every 10 years to maintain immunity against tetanus. Td may also be given as tetanus wound prophylaxis. Tdap Vaccine - COST NOT COVERED BY MEDICARE PART B Recommended at least once for all adults. For pregnant patients, recommended with each pregnancy. Shingles Vaccine (Shingrix) - COST NOT COVERED BY MEDICARE PART B  2 shot series recommended in those aged 48 and above     Health Maintenance Due:      Topic Date Due    Hepatitis C Screening  Never done    Colorectal Cancer Screening  Never done     Immunizations Due:      Topic Date Due    COVID-19 Vaccine (1) Never done    Pneumococcal Vaccine: 65+ Years (1 - PCV) Never done    Influenza Vaccine (1) 09/01/2023     Advance Directives   What are advance directives? Advance directives are legal documents that state your wishes and plans for medical care.  These plans are made ahead of time in case you lose your ability to make decisions for yourself. Advance directives can apply to any medical decision, such as the treatments you want, and if you want to donate organs. What are the types of advance directives? There are many types of advance directives, and each state has rules about how to use them. You may choose a combination of any of the following:  Living will: This is a written record of the treatment you want. You can also choose which treatments you do not want, which to limit, and which to stop at a certain time. This includes surgery, medicine, IV fluid, and tube feedings. Durable power of  for [facility]): This is a written record that states who you want to make healthcare choices for you when you are unable to make them for yourself. This person, called a proxy, is usually a family member or a friend. You may choose more than 1 proxy. Do not resuscitate (DNR) order:  A DNR order is used in case your heart stops beating or you stop breathing. It is a request not to have certain forms of treatment, such as CPR. A DNR order may be included in other types of advance directives. Medical directive: This covers the care that you want if you are in a coma, near death, or unable to make decisions for yourself. You can list the treatments you want for each condition. Treatment may include pain medicine, surgery, blood transfusions, dialysis, IV or tube feedings, and a ventilator (breathing machine). Values history: This document has questions about your views, beliefs, and how you feel and think about life. This information can help others choose the care that you would choose. Why are advance directives important? An advance directive helps you control your care. Although spoken wishes may be used, it is better to have your wishes written down. Spoken wishes can be misunderstood, or not followed. Treatments may be given even if you do not want them.  An advance directive may make it easier for your family to make difficult choices about your care. Weight Management   Why it is important to manage your weight:  Being overweight increases your risk of health conditions such as heart disease, high blood pressure, type 2 diabetes, and certain types of cancer. It can also increase your risk for osteoarthritis, sleep apnea, and other respiratory problems. Aim for a slow, steady weight loss. Even a small amount of weight loss can lower your risk of health problems. How to lose weight safely:  A safe and healthy way to lose weight is to eat fewer calories and get regular exercise. You can lose up about 1 pound a week by decreasing the number of calories you eat by 500 calories each day. Healthy meal plan for weight management:  A healthy meal plan includes a variety of foods, contains fewer calories, and helps you stay healthy. A healthy meal plan includes the following:  Eat whole-grain foods more often. A healthy meal plan should contain fiber. Fiber is the part of grains, fruits, and vegetables that is not broken down by your body. Whole-grain foods are healthy and provide extra fiber in your diet. Some examples of whole-grain foods are whole-wheat breads and pastas, oatmeal, brown rice, and bulgur. Eat a variety of vegetables every day. Include dark, leafy greens such as spinach, kale, [place] greens, and mustard greens. Eat yellow and orange vegetables such as carrots, sweet potatoes, and winter squash. Eat a variety of fruits every day. Choose fresh or canned fruit (canned in its own juice or light syrup) instead of juice. Fruit juice has very little or no fiber. Eat low-fat dairy foods. Drink fat-free (skim) milk or 1% milk. Eat fat-free yogurt and low-fat cottage cheese. Try low-fat cheeses such as mozzarella and other reduced-fat cheeses. Choose meat and other protein foods that are low in fat. Choose beans or other legumes such as split peas or lentils.  Choose fish, skinless poultry (chicken or turkey), or lean cuts of red meat (beef or pork). Before you cook meat or poultry, cut off any visible fat. Use less fat and oil. Try baking foods instead of frying them. Add less fat, such as margarine, sour cream, regular salad dressing and mayonnaise to foods. Eat fewer high-fat foods. Some examples of high-fat foods include french fries, doughnuts, ice cream, and cakes. Eat fewer sweets. Limit foods and drinks that are high in sugar. This includes candy, cookies, regular soda, and sweetened drinks. Exercise:  Exercise at least 30 minutes per day on most days of the week. Some examples of exercise include walking, biking, dancing, and swimming. You can also fit in more physical activity by taking the stairs instead of the elevator or parking farther away from stores. Ask your healthcare provider about the best exercise plan for you. © Copyright [employer] 2018 Information is for End User's use only and may not be sold, redistributed or otherwise used for commercial purposes.  All illustrations and images included in CareNotes® are the copyrighted property of [employer].A[employer]M., Inc. or  [address]

## 2023-09-05 NOTE — RESULT ENCOUNTER NOTE
Cholesterol is okay. High amount of the protective HDL.   Blood sugar and kidney function are normal.

## 2023-09-05 NOTE — PROGRESS NOTES
Chief Complaint   Patient presents with   • Well Check     Physical         HPI   69-year-old male presents as a new patient. Past medical history significant for hypertension on 10 mg of lisinopril daily. He uses Propecia to prevent hair loss. No other chronic medical problems. History is negative for coronary artery disease, diabetes, hyperlipidemia, and cancer. Non-smoker. Drinks wine every day. No allergies to medications. Surgery done 24 years ago for recurrent GI bleeding included pyloroplasty and vagotomy. No problems since that time. Colonoscopy within the last 4 to 5 years done in [place]. As far as vaccinations, had Zostavax followed by 2 Shingrix shots. Has had pneumococcal vaccine and 6 COVID vaccines. Past Medical History:   Diagnosis Date   • Hypertension         Past Surgical History:   Procedure Laterality Date   • VAGOTOMY AND PYLOROPLASTY N/A 1999    Done after multiple GI bleeds with good success       Social History     Tobacco Use   • Smoking status: Never   • Smokeless tobacco: Never   Substance Use Topics   • Alcohol use: Yes     Comment: 3 glasses of wine a day       Social History     Social History Narrative     since 1987.    2 children. 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 Wife is an author. Plays tennis 5 days a week. Lives in [place] and has a vacation house in [place] which became primary residence in [facility]. Goes to [facility] for 4 months every winter. The following portions of the patient's history were reviewed and updated as appropriate: allergies, current medications, past family history, past medical history, past social history, past surgical history and problem list.      Review of Systems       /84   Pulse 64   Temp 97.6 °F (36.4 °C)   Resp 18   Ht 5' 11" (1.803 m)   Wt 96.2 kg (212 lb)   SpO2 99%   BMI 29.57 kg/m²      Physical Exam   Appears well. Repeat blood pressure 150/100.   No neck nodes.  Lungs are clear. Heart regular with no murmur. Abdomen soft and nontender. No edema. Mood upbeat. Affect appropriate. Current Outpatient Medications:   •  finasteride (PROSCAR) 5 mg tablet, , Disp: , Rfl:   •  lisinopril (ZESTRIL) 10 mg tablet, Take 1 tablet by mouth daily, Disp: , Rfl:      No problem-specific Assessment & Plan notes found for this encounter. Diagnoses and all orders for this visit:    Medicare annual wellness visit, subsequent    Primary hypertension    Hair loss        Patient Instructions     Medicare wellness exam was completed. Blood pressure is a bit high. Increase lisinopril to 15 mg daily. Check lipid profile, blood sugar and kidney function. Handout given about pros and cons of screening for prostate cancer from the choosing wisely program.  Vaccinations are up-to-date. Recommend the latest COVID vaccine when it comes out along with this years flu shot sometime in October. He was asked to call with the name of his colonoscopy doctor so we can get the information of his last colonoscopy. Could also make up some dates for vaccinations that can be placed in his medical record. Recheck 1 year or as needed. Medicare Preventive Visit Patient Instructions  Thank you for completing your Welcome to Medicare Visit or Medicare Annual Wellness Visit today. Your next wellness visit will be due in one year (9/5/2024). The screening/preventive services that you may require over the next 5-10 years are detailed below. Some tests may not apply to you based off risk factors and/or age. Screening tests ordered at today's visit but not completed yet may show as past due. Also, please note that scanned in results may not display below.   Preventive Screenings:  Service Recommendations Previous Testing/Comments   Colorectal Cancer Screening  · Colonoscopy    · Fecal Occult Blood Test (FOBT)/Fecal Immunochemical Test (FIT)  · Fecal DNA/Cologuard Test  · Flexible Sigmoidoscopy Age: 43-73 years old   Colonoscopy: every 10 years (May be performed more frequently if at higher risk)  OR  FOBT/FIT: every 1 year  OR  Cologuard: every 3 years  OR  Sigmoidoscopy: every 5 years  Screening may be recommended earlier than age 39 if at higher risk for colorectal cancer. Also, an individualized decision between you and your healthcare provider will decide whether screening between the ages of 77-80 would be appropriate. Colonoscopy: Not on file  FOBT/FIT: Not on file  Cologuard: Not on file  Sigmoidoscopy: Not on file          Prostate Cancer Screening Individualized decision between patient and health care provider in men between ages of 53-66   Medicare will cover every 12 months beginning on the day after your 50th birthday PSA: No results in last 5 years           Hepatitis C Screening Once for adults born between [address] [place] [address]  More frequently in patients at high risk for Hepatitis C Hep C Antibody: Not on file        Diabetes Screening 1-2 times per year if you're at risk for diabetes or have pre-diabetes Fasting glucose: No results in last 5 years (No results in last 5 years)  A1C: No results in last 5 years (No results in last 5 years)      Cholesterol Screening Once every 5 years if you don't have a lipid disorder. May order more often based on risk factors. Lipid panel: Not on file         Other Preventive Screenings Covered by Medicare:  1. Abdominal Aortic Aneurysm (AAA) Screening: covered once if your at risk. You're considered to be at risk if you have a family history of AAA or a male between the age of 70-76 who smoking at least 100 cigarettes in your lifetime.   2. Lung Cancer Screening: covers low dose CT scan once per year if you meet all of the following conditions: (1) Age 48-67; (2) No signs or symptoms of lung cancer; (3) Current smoker or have quit smoking within the last 15 years; (4) You have a tobacco smoking history of at least 20 pack years (packs per day x number of years you smoked); (5) You get a written order from a healthcare provider. 3. Glaucoma Screening: covered annually if you're considered high risk: (1) You have diabetes OR (2) Family history of glaucoma OR (3)  aged 48 and older OR (3)  American aged 72 and older  3. Osteoporosis Screening: covered every 2 years if you meet one of the following conditions: (1) Have a vertebral abnormality; (2) On glucocorticoid therapy for more than 3 months; (3) Have primary hyperparathyroidism; (4) On osteoporosis medications and need to assess response to drug therapy. 5. HIV Screening: covered annually if you're between the age of 14-79. Also covered annually if you are younger than 13 and older than 72 with risk factors for HIV infection. For pregnant patients, it is covered up to 3 times per pregnancy. Immunizations:  Immunization Recommendations   Influenza Vaccine Annual influenza vaccination during flu season is recommended for all persons aged >= 6 months who do not have contraindications   Pneumococcal Vaccine   * Pneumococcal conjugate vaccine = PCV13 (Prevnar 13), PCV15 (Vaxneuvance), PCV20 (Prevnar 20)  * Pneumococcal polysaccharide vaccine = PPSV23 (Pneumovax) Adults 20-63 years old: 1-3 doses may be recommended based on certain risk factors  Adults 72 years old: 1-2 doses may be recommended based off what pneumonia vaccine you previously received   Hepatitis B Vaccine 3 dose series if at intermediate or high risk (ex: diabetes, end stage renal disease, liver disease)   Tetanus (Td) Vaccine - COST NOT COVERED BY MEDICARE PART B Following completion of primary series, a booster dose should be given every 10 years to maintain immunity against tetanus. Td may also be given as tetanus wound prophylaxis. Tdap Vaccine - COST NOT COVERED BY MEDICARE PART B Recommended at least once for all adults. For pregnant patients, recommended with each pregnancy.    Shingles Vaccine (Shingrix) - COST NOT COVERED BY MEDICARE PART B  2 shot series recommended in those aged 48 and above     Health Maintenance Due:      Topic Date Due   • Hepatitis C Screening  Never done   • Colorectal Cancer Screening  Never done     Immunizations Due:      Topic Date Due   • COVID-19 Vaccine (1) Never done   • Pneumococcal Vaccine: 65+ Years (1 - PCV) Never done   • Influenza Vaccine (1) 09/01/2023     Advance Directives   What are advance directives? Advance directives are legal documents that state your wishes and plans for medical care. These plans are made ahead of time in case you lose your ability to make decisions for yourself. Advance directives can apply to any medical decision, such as the treatments you want, and if you want to donate organs. What are the types of advance directives? There are many types of advance directives, and each state has rules about how to use them. You may choose a combination of any of the following:  · Living will: This is a written record of the treatment you want. You can also choose which treatments you do not want, which to limit, and which to stop at a certain time. This includes surgery, medicine, IV fluid, and tube feedings. · Durable power of  for healthcare [facility]): This is a written record that states who you want to make healthcare choices for you when you are unable to make them for yourself. This person, called a proxy, is usually a family member or a friend. You may choose more than 1 proxy. · Do not resuscitate (DNR) order:  A DNR order is used in case your heart stops beating or you stop breathing. It is a request not to have certain forms of treatment, such as CPR. A DNR order may be included in other types of advance directives. · Medical directive: This covers the care that you want if you are in a coma, near death, or unable to make decisions for yourself. You can list the treatments you want for each condition.  Treatment may include pain medicine, surgery, blood transfusions, dialysis, IV or tube feedings, and a ventilator (breathing machine). · Values history: This document has questions about your views, beliefs, and how you feel and think about life. This information can help others choose the care that you would choose. Why are advance directives important? An advance directive helps you control your care. Although spoken wishes may be used, it is better to have your wishes written down. Spoken wishes can be misunderstood, or not followed. Treatments may be given even if you do not want them. An advance directive may make it easier for your family to make difficult choices about your care. Weight Management   Why it is important to manage your weight:  Being overweight increases your risk of health conditions such as heart disease, high blood pressure, type 2 diabetes, and certain types of cancer. It can also increase your risk for osteoarthritis, sleep apnea, and other respiratory problems. Aim for a slow, steady weight loss. Even a small amount of weight loss can lower your risk of health problems. How to lose weight safely:  A safe and healthy way to lose weight is to eat fewer calories and get regular exercise. You can lose up about 1 pound a week by decreasing the number of calories you eat by 500 calories each day. Healthy meal plan for weight management:  A healthy meal plan includes a variety of foods, contains fewer calories, and helps you stay healthy. A healthy meal plan includes the following:  · Eat whole-grain foods more often. A healthy meal plan should contain fiber. Fiber is the part of grains, fruits, and vegetables that is not broken down by your body. Whole-grain foods are healthy and provide extra fiber in your diet. Some examples of whole-grain foods are whole-wheat breads and pastas, oatmeal, brown rice, and bulgur. · Eat a variety of vegetables every day.   Include dark, leafy greens such as spinach, kale, [place] greens, and mustard greens. Eat yellow and orange vegetables such as carrots, sweet potatoes, and winter squash. · Eat a variety of fruits every day. Choose fresh or canned fruit (canned in its own juice or light syrup) instead of juice. Fruit juice has very little or no fiber. · Eat low-fat dairy foods. Drink fat-free (skim) milk or 1% milk. Eat fat-free yogurt and low-fat cottage cheese. Try low-fat cheeses such as mozzarella and other reduced-fat cheeses. · Choose meat and other protein foods that are low in fat. Choose beans or other legumes such as split peas or lentils. Choose fish, skinless poultry (chicken or turkey), or lean cuts of red meat (beef or pork). Before you cook meat or poultry, cut off any visible fat. · Use less fat and oil. Try baking foods instead of frying them. Add less fat, such as margarine, sour cream, regular salad dressing and mayonnaise to foods. Eat fewer high-fat foods. Some examples of high-fat foods include french fries, doughnuts, ice cream, and cakes. · Eat fewer sweets. Limit foods and drinks that are high in sugar. This includes candy, cookies, regular soda, and sweetened drinks. Exercise:  Exercise at least 30 minutes per day on most days of the week. Some examples of exercise include walking, biking, dancing, and swimming. You can also fit in more physical activity by taking the stairs instead of the elevator or parking farther away from stores. Ask your healthcare provider about the best exercise plan for you. © Copyright [employer] 2018 Information is for End User's use only and may not be sold, redistributed or otherwise used for commercial purposes.  All illustrations and images included in CareNotes® are the copyrighted property of A.[employer].A[employer]M., Inc. or  [employer]

## 2023-09-05 NOTE — PROGRESS NOTES
Assessment and Plan:     Problem List Items Addressed This Visit    None  Visit Diagnoses     Medicare annual wellness visit, subsequent    -  Primary           Preventive health issues were discussed with patient, and age appropriate screening tests were ordered as noted in patient's After Visit Summary. Personalized health advice and appropriate referrals for health education or preventive services given if needed, as noted in patient's After Visit Summary. History of Present Illness:     Patient presents for a Medicare Wellness Visit    HPI   Patient Care Team:  Ila Strickland MD as PCP - General (Family Medicine)     Review of Systems:     Review of Systems     Problem List:     There is no problem list on file for this patient. Past Medical and Surgical History:     History reviewed. No pertinent past medical history. History reviewed. No pertinent surgical history. Family History:     History reviewed. No pertinent family history.    Social History:     Social History     Socioeconomic History   • Marital status: /Civil Union     Spouse name: None   • Number of children: None   • Years of education: None   • Highest education level: None   Occupational History   • None   Tobacco Use   • Smoking status: Never   • Smokeless tobacco: Never   Vaping Use   • Vaping Use: Never used   Substance and Sexual Activity   • Alcohol use: Yes     Comment: 3 glasses of wine a day   • Drug use: Not Currently   • Sexual activity: Yes   Other Topics Concern   • None   Social History Narrative   • None     Social Determinants of Health     Financial Resource Strain: Not on file   Food Insecurity: Not on file   Transportation Needs: Not on file   Physical Activity: Not on file   Stress: Not on file   Social Connections: Not on file   Intimate Partner Violence: Not on file   Housing Stability: Not on file      Medications and Allergies:     Current Outpatient Medications   Medication Sig Dispense Refill   • finasteride (PROSCAR) 5 mg tablet      • lisinopril (ZESTRIL) 10 mg tablet Take 1 tablet by mouth daily     • lisinopril (ZESTRIL) 10 mg tablet  (Patient not taking: Reported on 9/5/2023)       No current facility-administered medications for this visit. No Known Allergies   Immunizations: There is no immunization history on file for this patient. Health Maintenance:         Topic Date Due   • Hepatitis C Screening  Never done   • Colorectal Cancer Screening  Never done         Topic Date Due   • COVID-19 Vaccine (1) Never done   • Pneumococcal Vaccine: 65+ Years (1 - PCV) Never done   • Influenza Vaccine (1) 09/01/2023      Medicare Screening Tests and Risk Assessments:         Health Risk Assessment:   Patient rates overall health as excellent. Patient feels that their physical health rating is same. Patient is satisfied with their life. Eyesight was rated as slightly worse. Hearing was rated as slightly worse. Patient feels that their emotional and mental health rating is same. Patients states they are never, rarely angry. Patient states they are never, rarely unusually tired/fatigued. Pain experienced in the last 7 days has been some. Patient's pain rating has been 3/10. Patient states that he has experienced no weight loss or gain in last 6 months. Fall Risk Screening: In the past year, patient has experienced: no history of falling in past year      Home Safety:  Patient does not have trouble with stairs inside or outside of their home. Patient has working smoke alarms and has working carbon monoxide detector. Home safety hazards include: none. Nutrition:   Current diet is Regular. Medications:   Patient is currently taking over-the-counter supplements. OTC medications include: see medication list. Patient is able to manage medications.      Activities of Daily Living (ADLs)/Instrumental Activities of Daily Living (IADLs):   Walk and transfer into and out of bed and chair?: Yes  Dress and groom yourself?: Yes    Bathe or shower yourself?: Yes    Feed yourself? Yes  Do your laundry/housekeeping?: Yes  Manage your money, pay your bills and track your expenses?: Yes  Make your own meals?: Yes    Do your own shopping?: Yes    Previous Hospitalizations:   Any hospitalizations or ED visits within the last 12 months?: No      Advance Care Planning:   Living will: No      PREVENTIVE SCREENINGS        Abdominal Aortic Aneurysm (AAA) Screening:    Risk factors include: age between 70-77 yo        Lung Cancer Screening:     General: Screening Not Indicated    Screening, Brief Intervention, and Referral to Treatment (SBIRT)    Screening      Single Item Drug Screening:  How often have you used an illegal drug (including marijuana) or a prescription medication for non-medical reasons in the past year? never    Single Item Drug Screen Score: 0  Interpretation: Negative screen for possible drug use disorder    No results found. Physical Exam:     There were no vitals taken for this visit.     Physical Exam     Darion Rosales MD

## 2023-09-11 RX ORDER — LISINOPRIL AND HYDROCHLOROTHIAZIDE 12.5; 1 MG/1; MG/1
1 TABLET ORAL DAILY
Qty: 90 TABLET | Refills: 3 | Status: SHIPPED | OUTPATIENT
Start: 2023-09-11

## 2023-11-29 DIAGNOSIS — L65.9 HAIR LOSS: ICD-10-CM

## 2023-11-29 DIAGNOSIS — I10 PRIMARY HYPERTENSION: ICD-10-CM

## 2023-11-29 RX ORDER — FINASTERIDE 5 MG/1
5 TABLET, FILM COATED ORAL DAILY
Qty: 90 TABLET | Refills: 0 | Status: SHIPPED | OUTPATIENT
Start: 2023-11-29

## 2023-11-29 RX ORDER — LISINOPRIL AND HYDROCHLOROTHIAZIDE 12.5; 1 MG/1; MG/1
1 TABLET ORAL DAILY
Qty: 90 TABLET | Refills: 0 | Status: SHIPPED | OUTPATIENT
Start: 2023-11-29

## 2023-12-01 ENCOUNTER — OFFICE VISIT (OUTPATIENT)
Dept: OBGYN CLINIC | Facility: OTHER | Age: 68
End: 2023-12-01
Payer: MEDICARE

## 2023-12-01 VITALS
BODY MASS INDEX: 30.8 KG/M2 | DIASTOLIC BLOOD PRESSURE: 89 MMHG | WEIGHT: 220 LBS | HEIGHT: 71 IN | SYSTOLIC BLOOD PRESSURE: 137 MMHG | HEART RATE: 76 BPM

## 2023-12-01 DIAGNOSIS — M25.562 CHRONIC PAIN OF LEFT KNEE: Primary | ICD-10-CM

## 2023-12-01 DIAGNOSIS — M17.0 PRIMARY OSTEOARTHRITIS OF BOTH KNEES: ICD-10-CM

## 2023-12-01 DIAGNOSIS — G89.29 CHRONIC PAIN OF LEFT KNEE: Primary | ICD-10-CM

## 2023-12-01 PROCEDURE — 99214 OFFICE O/P EST MOD 30 MIN: CPT | Performed by: FAMILY MEDICINE

## 2023-12-01 PROCEDURE — 20611 DRAIN/INJ JOINT/BURSA W/US: CPT | Performed by: FAMILY MEDICINE

## 2023-12-01 RX ORDER — TRIAMCINOLONE ACETONIDE 40 MG/ML
40 INJECTION, SUSPENSION INTRA-ARTICULAR; INTRAMUSCULAR
Status: COMPLETED | OUTPATIENT
Start: 2023-12-01 | End: 2023-12-01

## 2023-12-01 RX ORDER — BUPIVACAINE HYDROCHLORIDE 2.5 MG/ML
4 INJECTION, SOLUTION INFILTRATION; PERINEURAL
Status: COMPLETED | OUTPATIENT
Start: 2023-12-01 | End: 2023-12-01

## 2023-12-01 RX ADMIN — BUPIVACAINE HYDROCHLORIDE 4 ML: 2.5 INJECTION, SOLUTION INFILTRATION; PERINEURAL at 14:15

## 2023-12-01 RX ADMIN — TRIAMCINOLONE ACETONIDE 40 MG: 40 INJECTION, SUSPENSION INTRA-ARTICULAR; INTRAMUSCULAR at 14:15

## 2023-12-01 NOTE — PROGRESS NOTES
1. Chronic pain of left knee  MRI knee left  wo contrast      2. Primary osteoarthritis of both knees  Injection Procedure Prior Authorization    Brace        Orders Placed This Encounter   Procedures    Brace    MRI knee left  wo contrast    Injection Procedure Prior Authorization        IMAGING STUDIES: (I personally reviewed images in PACS and report):  X-ray of bilateral knees 8/31/2023:  Physician read: Mild bilateral patellofemoral arthritis          ASSESSMENT/PLAN:  Bilateral mild patellofemoral knee osteoarthritis on x-ray  Suspect moderate left knee patellofemoral arthritis to be revealed on MRI  Avid     Repeat X-ray next visit: None    Return in about 11 days (around 12/12/2023). Patient instructions below verbally summarized in person during encounter:  Patient Instructions   Bilateral corticosteroid injection for osteoarthritis  Patient does continue to have left knee pain anterolateral with only short-term relief after corticosteroid injection in August 2023. As such I have ordered MRI of the left knee. Patient has upcoming trip and will be out of the country for approximately 4 months. Prior to his trip we will attempt to seek approval for viscosupplementation with planned injection under ultrasound guidance on 12/12/2023.      __________________________________________________________________________    HISTORY OF PRESENT ILLNESS:    Patient presents with knee pain.  History, examination, and x-rays are consistent with diagnosis of Osteoarthritis bilateral patellofemoral.     Nonpharmacologic treatment: home exercise program placed on chart  Pain Score:   moderate approximately 5-6/10  Pain from condition does interfere with activities of daily living  Previous Visco relief: never performed  Previous CSI relief: short-lived relief only after CSI [facility] 8/31/23        Review of Systems      Following history reviewed and update:    Past Medical History:   Diagnosis Date    Hypertension Past Surgical History:   Procedure Laterality Date    VAGOTOMY AND PYLOROPLASTY N/A 1999    Done after multiple GI bleeds with good success     Social History   Social History     Substance and Sexual Activity   Alcohol Use Yes    Comment: 3 glasses of wine a day     Social History     Substance and Sexual Activity   Drug Use Not Currently     Social History     Tobacco Use   Smoking Status Never   Smokeless Tobacco Never     Family History   Problem Relation Age of Onset    Lung cancer Mother      No Known Allergies       Physical Exam  /89 (BP Location: Left arm, Patient Position: Sitting, Cuff Size: Standard)   Pulse 76   Ht 5' 11" (1.803 m)   Wt 99.8 kg (220 lb)   BMI 30.68 kg/m²     Constitutional:  see vital signs  Gen: well-developed, normocephalic/atraumatic, well-groomed  Eyes: No inflammation or discharge of conjunctiva or lids; sclera clear   Pharynx: no inflammation, lesion, or mass of lips  Neck: supple, no masses, non-distended  MSK: no inflammation, lesion, mass, or clubbing of nails and digits except for other than mentioned below  SKIN: no visible rashes or skin lesions  Pulmonary/Chest: Effort normal. No respiratory distress.    NEURO: cranial nerves grossly intact  PSYCH:  Alert and oriented to person, place, and time; recent and remote memory intact; mood normal, no depression, anxiety, or agitation, judgment and insight good and intact     Ortho Exam  LEFT KNEE:  Erythema: no  Swelling: no  Increased Warmth: no  Tenderness: +medial and lateral PFJL  Flexion: intact  Extension: intact  Patellar Displacement:  Patellar Tilt:  Patellar Apprehension: negative  Patellar Grind Blanc's: +  Lachman's: negative  Drawer: negative  Varus laxity: negative  Valgus laxity: negative  [facility]: negative       RIGHT KNEE:  Erythema: no  Swelling: no  Increased Warmth: no  __________________________________________________________________________  Large joint arthrocentesis: bilateral knee  Universal Protocol:  Consent: Verbal consent obtained. Risks and benefits: risks, benefits and alternatives were discussed  Consent given by: patient  Time out: Immediately prior to procedure a "time out" was called to verify the correct patient, procedure, equipment, support staff and site/side marked as required.   Patient understanding: patient states understanding of the procedure being performed  Site marked: the operative site was marked  Patient identity confirmed: verbally with patient  Supporting Documentation  Indications: pain and diagnostic evaluation   Procedure Details  Location: knee - bilateral knee  Preparation: Patient was prepped and draped in the usual sterile fashion  Needle size: 22 G  Ultrasound guidance: yes  Approach: superior    Medications (Right): 4 mL bupivacaine 0.25 %; 40 mg triamcinolone acetonide 40 mg/mLMedications (Left): 4 mL bupivacaine 0.25 %; 40 mg triamcinolone acetonide 40 mg/mL   Patient tolerance: patient tolerated the procedure well with no immediate complications  Dressing:  Sterile dressing applied

## 2023-12-01 NOTE — PATIENT INSTRUCTIONS
Bilateral corticosteroid injection for osteoarthritis  Patient does continue to have left knee pain anterolateral with only short-term relief after corticosteroid injection in August 2023. As such I have ordered MRI of the left knee. Patient has upcoming trip and will be out of the country for approximately 4 months. Prior to his trip we will attempt to seek approval for viscosupplementation with planned injection under ultrasound guidance on 12/12/2023 since he has only had short-term relief with previous corticosteroid injections.

## 2023-12-08 ENCOUNTER — HOSPITAL ENCOUNTER (OUTPATIENT)
Facility: MEDICAL CENTER | Age: 68
Discharge: HOME | End: 2023-12-08
Payer: MEDICARE

## 2023-12-08 DIAGNOSIS — G89.29 CHRONIC PAIN OF LEFT KNEE: ICD-10-CM

## 2023-12-08 DIAGNOSIS — M25.562 CHRONIC PAIN OF LEFT KNEE: ICD-10-CM

## 2023-12-08 PROCEDURE — 73721 MRI JNT OF LWR EXTRE W/O DYE: CPT

## 2023-12-08 PROCEDURE — G1004 CDSM NDSC: HCPCS

## 2023-12-11 ENCOUNTER — PROCEDURE VISIT (OUTPATIENT)
Dept: OBGYN CLINIC | Facility: CLINIC | Age: 68
End: 2023-12-11
Payer: MEDICARE

## 2023-12-11 VITALS
HEART RATE: 76 BPM | BODY MASS INDEX: 30.8 KG/M2 | WEIGHT: 220 LBS | HEIGHT: 71 IN | SYSTOLIC BLOOD PRESSURE: 135 MMHG | DIASTOLIC BLOOD PRESSURE: 86 MMHG

## 2023-12-11 DIAGNOSIS — M17.0 PRIMARY OSTEOARTHRITIS OF BOTH KNEES: Primary | ICD-10-CM

## 2023-12-11 PROCEDURE — 20611 DRAIN/INJ JOINT/BURSA W/US: CPT | Performed by: FAMILY MEDICINE

## 2023-12-11 RX ADMIN — LIDOCAINE HYDROCHLORIDE 4 ML: 10 INJECTION, SOLUTION INFILTRATION; PERINEURAL at 14:15

## 2023-12-11 NOTE — PROGRESS NOTES
1. Primary osteoarthritis of both knees  Large joint arthrocentesis: bilateral knee        Orders Placed This Encounter   Procedures    Large joint arthrocentesis: bilateral knee      MRI Left Knee 12/8/23:  Small radial tear posterior horn of the medial meniscus adjacent to the posterior root with a small flap of meniscal tissue extending towards intercondylar notch. Quadriceps insertional tendinosis. Borderline patella viri. Moderate patella cartilage thinning. Small joint effusion and small partially ruptured Baker's cyst.      ASSESSMENT/PLAN:  Bilateral PFPS & OA  Left Knee Moderate Patellar cartilage loss on MRI  Left Quadriceps insertional tendinitis  Avid         Return if symptoms worsen or fail to improve. Patient instructions below verbally summarized in person during encounter:  Patient Instructions   Trial of bilateral USG viscosupplementation      __________________________________________________________________________    Past Medical History:   Diagnosis Date    Hypertension      No Known Allergies       /86 (BP Location: Right arm, Patient Position: Sitting, Cuff Size: Standard)   Pulse 76   Ht 5' 11" (1.803 m)   Wt 99.8 kg (220 lb)   BMI 30.68 kg/m²     __________________________________________________________________________  Large joint arthrocentesis: bilateral knee  Universal Protocol:  Consent: Verbal consent obtained. Risks and benefits: risks, benefits and alternatives were discussed  Consent given by: patient  Time out: Immediately prior to procedure a "time out" was called to verify the correct patient, procedure, equipment, support staff and site/side marked as required.   Patient understanding: patient states understanding of the procedure being performed  Site marked: the operative site was marked  Patient identity confirmed: verbally with patient  Supporting Documentation  Indications: pain   Procedure Details  Location: knee - bilateral knee  Preparation: Patient was prepped and draped in the usual sterile fashion (betadine if not allergic.  alcohol before and after ethyl chloride spray)  Needle size: 22 G  Ultrasound guidance: yes  Approach: superior    Medications (Right): 4 mL lidocaine 1 %; 88 mg hyaluronan 88 MG/4MLMedications (Left): 4 mL lidocaine 1 %; 88 mg hyaluronan 88 MG/4ML   Patient tolerance: patient tolerated the procedure well with no immediate complications  Dressing:  Sterile dressing applied

## 2023-12-15 RX ORDER — LIDOCAINE HYDROCHLORIDE 10 MG/ML
4 INJECTION, SOLUTION INFILTRATION; PERINEURAL
Status: COMPLETED | OUTPATIENT
Start: 2023-12-11 | End: 2023-12-11

## 2024-02-26 DIAGNOSIS — I10 PRIMARY HYPERTENSION: ICD-10-CM

## 2024-02-26 RX ORDER — LISINOPRIL AND HYDROCHLOROTHIAZIDE 12.5; 1 MG/1; MG/1
1 TABLET ORAL DAILY
Qty: 90 TABLET | Refills: 0 | Status: SHIPPED | OUTPATIENT
Start: 2024-02-26

## 2024-06-10 ENCOUNTER — TELEPHONE (OUTPATIENT)
Age: 69
End: 2024-06-10

## 2024-06-10 NOTE — TELEPHONE ENCOUNTER
Caller: Patient   Doctor/office: Florencia Barnard   #: [phone]       Patient called to start auth/delivery for VISCO(Gel) injections.    Body Part: Both   Pain Level (scale 1-10): 7   Date of last Gel Injection: 12/23     Educated patient on Visco procedure. Auth team will review insurance and process the request appropriately. Patient will be contacted if the have any questions and when ready to schedule.

## 2024-06-11 DIAGNOSIS — M17.0 PRIMARY OSTEOARTHRITIS OF BOTH KNEES: Primary | ICD-10-CM

## 2024-07-26 ENCOUNTER — TELEPHONE (OUTPATIENT)
Dept: OBGYN CLINIC | Facility: OTHER | Age: 69
End: 2024-07-26

## 2024-07-26 ENCOUNTER — PROCEDURE VISIT (OUTPATIENT)
Dept: OBGYN CLINIC | Facility: OTHER | Age: 69
End: 2024-07-26
Payer: MEDICARE

## 2024-07-26 VITALS — BODY MASS INDEX: 27.83 KG/M2 | WEIGHT: 210 LBS | HEIGHT: 73 IN

## 2024-07-26 DIAGNOSIS — Z00.6 ENCOUNTER FOR EXAMINATION FOR NORMAL COMPARISON OR CONTROL IN CLINICAL RESEARCH PROGRAM: ICD-10-CM

## 2024-07-26 DIAGNOSIS — M17.0 PRIMARY OSTEOARTHRITIS OF BOTH KNEES: Primary | ICD-10-CM

## 2024-07-26 PROCEDURE — 20611 DRAIN/INJ JOINT/BURSA W/US: CPT | Performed by: FAMILY MEDICINE

## 2024-07-26 RX ORDER — BUPIVACAINE HYDROCHLORIDE 2.5 MG/ML
5 INJECTION, SOLUTION EPIDURAL; INFILTRATION; INTRACAUDAL
Status: COMPLETED | OUTPATIENT
Start: 2024-07-26 | End: 2024-07-26

## 2024-07-26 RX ADMIN — BUPIVACAINE HYDROCHLORIDE 5 ML: 2.5 INJECTION, SOLUTION EPIDURAL; INFILTRATION; INTRACAUDAL at 15:30

## 2024-07-26 NOTE — TELEPHONE ENCOUNTER
Ms. Rush, please inquire with auth team if a second round of visco will be approved in December.   
Add 39115 Cpt? (Important Note: In 2017 The Use Of 78269 Is Being Tracked By Cms To Determine Future Global Period Reimbursement For Global Periods): yes
Wound Evaluated By: Cuate Webb MD
Detail Level: Detailed

## 2024-07-26 NOTE — PROGRESS NOTES
"1. Primary osteoarthritis of both knees  Injection Procedure Prior Authorization        Orders Placed This Encounter   Procedures    Injection Procedure Prior Authorization        IMAGING STUDIES: (I personally reviewed images in PACS and report):  Xray knee bilateral 8/31/23:  Mild to mod PFOA, lateral OA                  ASSESSMENT/PLAN:  Bilateral Knee PFOA    Repeat X-ray next visit: None    Return for Follow-up for Ultrasound Guided Injection in 6 months.    Patient instructions below verbally summarized in person during encounter:  Patient Instructions   Repeat bilateral monovisc       __________________________________________________________________________    HISTORY OF PRESENT ILLNESS:      Patient presents with knee pain. History, examination, and x-rays are consistent with diagnosis of Osteoarthritis.     Nonpharmacologic treatment: home exercise program  Pain Score:   10/10  Pain from condition does interfere with activities of daily living  Previous Visco relief: significant relief for 4 months  Previous CSI relief: minimal to mild relief      Review of Systems      Following history reviewed and update:    Past Medical History:   Diagnosis Date    Hypertension      Past Surgical History:   Procedure Laterality Date    VAGOTOMY AND PYLOROPLASTY N/A 1999    Done after multiple GI bleeds with good success     Social History   Social History     Substance and Sexual Activity   Alcohol Use Yes    Comment: 3 glasses of wine a day     Social History     Substance and Sexual Activity   Drug Use Not Currently     Social History     Tobacco Use   Smoking Status Never   Smokeless Tobacco Never     Family History   Problem Relation Age of Onset    Lung cancer Mother      No Known Allergies       Physical Exam  Ht 6' 1\" (1.854 m)   Wt 95.3 kg (210 lb)   BMI 27.71 kg/m²         Ortho Exam  RIGHT KNEE:  Erythema: no  Swelling: no  Increased Warmth: no    LEFT KNEE:  Erythema: no  Swelling: no  Increased Warmth: " "no  __________________________________________________________________________  Large joint arthrocentesis: bilateral knee  Universal Protocol:  Consent: Verbal consent obtained.  Risks and benefits: risks, benefits and alternatives were discussed  Consent given by: patient  Time out: Immediately prior to procedure a \"time out\" was called to verify the correct patient, procedure, equipment, support staff and site/side marked as required.  Patient understanding: patient states understanding of the procedure being performed  Site marked: the operative site was marked  Patient identity confirmed: verbally with patient  Supporting Documentation  Indications: pain   Procedure Details  Location: knee - bilateral knee  Preparation: Patient was prepped and draped in the usual sterile fashion (betadine if not allergic. alcohol before and after ethyl chloride spray)  Needle size: 18 G  Ultrasound guidance: yes  Approach: anterolateral    Medications (Right): 5 mL bupivacaine (PF) 0.25 %; 88 mg hyaluronan 88 MG/4MLMedications (Left): 5 mL bupivacaine (PF) 0.25 %; 88 mg hyaluronan 88 MG/4ML   Patient tolerance: patient tolerated the procedure well with no immediate complications  Dressing:  Sterile dressing applied                        "

## 2024-08-03 DIAGNOSIS — L65.9 HAIR LOSS: ICD-10-CM

## 2024-08-03 DIAGNOSIS — I10 PRIMARY HYPERTENSION: ICD-10-CM

## 2024-08-04 RX ORDER — FINASTERIDE 5 MG/1
5 TABLET, FILM COATED ORAL DAILY
Qty: 90 TABLET | Refills: 0 | Status: SHIPPED | OUTPATIENT
Start: 2024-08-04

## 2024-08-05 RX ORDER — LISINOPRIL AND HYDROCHLOROTHIAZIDE 12.5; 1 MG/1; MG/1
1 TABLET ORAL DAILY
Qty: 100 TABLET | Refills: 1 | Status: SHIPPED | OUTPATIENT
Start: 2024-08-05

## 2024-09-19 ENCOUNTER — OFFICE VISIT (OUTPATIENT)
Dept: FAMILY MEDICINE CLINIC | Facility: CLINIC | Age: 69
End: 2024-09-19
Payer: MEDICARE

## 2024-09-19 ENCOUNTER — APPOINTMENT (OUTPATIENT)
Dept: LAB | Facility: HOSPITAL | Age: 69
End: 2024-09-19
Attending: PATHOLOGY
Payer: MEDICARE

## 2024-09-19 ENCOUNTER — LAB (OUTPATIENT)
Dept: LAB | Facility: CLINIC | Age: 69
End: 2024-09-19
Attending: PATHOLOGY
Payer: MEDICARE

## 2024-09-19 VITALS
HEART RATE: 64 BPM | WEIGHT: 210.13 LBS | HEIGHT: 73 IN | SYSTOLIC BLOOD PRESSURE: 110 MMHG | RESPIRATION RATE: 18 BRPM | BODY MASS INDEX: 27.85 KG/M2 | OXYGEN SATURATION: 98 % | TEMPERATURE: 97.8 F | DIASTOLIC BLOOD PRESSURE: 60 MMHG

## 2024-09-19 DIAGNOSIS — I10 PRIMARY HYPERTENSION: ICD-10-CM

## 2024-09-19 DIAGNOSIS — Z00.00 MEDICARE ANNUAL WELLNESS VISIT, SUBSEQUENT: Primary | ICD-10-CM

## 2024-09-19 DIAGNOSIS — M17.0 PRIMARY OSTEOARTHRITIS OF BOTH KNEES: Primary | ICD-10-CM

## 2024-09-19 DIAGNOSIS — Z00.6 ENCOUNTER FOR EXAMINATION FOR NORMAL COMPARISON OR CONTROL IN CLINICAL RESEARCH PROGRAM: ICD-10-CM

## 2024-09-19 LAB
ANION GAP SERPL CALCULATED.3IONS-SCNC: 9 MMOL/L (ref 4–13)
BUN SERPL-MCNC: 18 MG/DL (ref 5–25)
CALCIUM SERPL-MCNC: 9.3 MG/DL (ref 8.4–10.2)
CHLORIDE SERPL-SCNC: 102 MMOL/L (ref 96–108)
CHOLEST SERPL-MCNC: 195 MG/DL
CO2 SERPL-SCNC: 27 MMOL/L (ref 21–32)
CREAT SERPL-MCNC: 1.02 MG/DL (ref 0.6–1.3)
GFR SERPL CREATININE-BSD FRML MDRD: 74 ML/MIN/1.73SQ M
GLUCOSE P FAST SERPL-MCNC: 113 MG/DL (ref 65–99)
HDLC SERPL-MCNC: 54 MG/DL
LDLC SERPL CALC-MCNC: 91 MG/DL (ref 0–100)
NONHDLC SERPL-MCNC: 141 MG/DL
POTASSIUM SERPL-SCNC: 4.1 MMOL/L (ref 3.5–5.3)
SODIUM SERPL-SCNC: 138 MMOL/L (ref 135–147)
TRIGL SERPL-MCNC: 252 MG/DL

## 2024-09-19 PROCEDURE — 36415 COLL VENOUS BLD VENIPUNCTURE: CPT

## 2024-09-19 PROCEDURE — 99213 OFFICE O/P EST LOW 20 MIN: CPT | Performed by: FAMILY MEDICINE

## 2024-09-19 PROCEDURE — G0439 PPPS, SUBSEQ VISIT: HCPCS | Performed by: FAMILY MEDICINE

## 2024-09-19 PROCEDURE — 80061 LIPID PANEL: CPT

## 2024-09-19 PROCEDURE — 80048 BASIC METABOLIC PNL TOTAL CA: CPT

## 2024-09-19 RX ORDER — LISINOPRIL/HYDROCHLOROTHIAZIDE 10-12.5 MG
1 TABLET ORAL 2 TIMES DAILY
Qty: 180 TABLET | Refills: 3 | Status: SHIPPED | OUTPATIENT
Start: 2024-09-19

## 2024-09-19 NOTE — PROGRESS NOTES
Ambulatory Visit  Name: Francis Cole      : 1955      MRN: [id number]  Encounter Provider: Dieter Perez MD  Encounter Date: 2024   Encounter department: [facility]    Assessment & Plan  Medicare annual wellness visit, subsequent            Preventive health issues were discussed with patient, and age appropriate screening tests were ordered as noted in patient's After Visit Summary. Personalized health advice and appropriate referrals for health education or preventive services given if needed, as noted in patient's After Visit Summary.    History of Present Illness     HPI   Patient Care Team:  Dieter Perez MD as PCP - General (Family Medicine)    Review of Systems  Medical History Reviewed by provider this encounter:       Annual Wellness Visit Questionnaire       Health Risk Assessment:   Patient rates overall health as excellent. Patient feels that their physical health rating is same. Patient is very satisfied with their life. Eyesight was rated as slightly worse. Hearing was rated as slightly worse. Patient feels that their emotional and mental health rating is same. Patients states they are never, rarely angry. Patient states they are never, rarely unusually tired/fatigued. Pain experienced in the last 7 days has been none. Patient states that he has experienced no weight loss or gain in last 6 months.     Fall Risk Screening:   In the past year, patient has experienced: no history of falling in past year      Home Safety:  Patient does not have trouble with stairs inside or outside of their home. Patient has working smoke alarms and has working carbon monoxide detector. Home safety hazards include: none.     Nutrition:   Current diet is Regular.     Medications:   Patient is currently taking over-the-counter supplements. OTC medications include: see medication list. Patient is able to manage medications.     Activities of Daily Living (ADLs)/Instrumental  Activities of Daily Living (IADLs):   Walk and transfer into and out of bed and chair?: Yes  Dress and groom yourself?: Yes    Bathe or shower yourself?: Yes    Feed yourself? Yes  Do your laundry/housekeeping?: Yes  Manage your money, pay your bills and track your expenses?: Yes  Make your own meals?: Yes    Do your own shopping?: Yes    Previous Hospitalizations:   Any hospitalizations or ED visits within the last 12 months?: No      Advance Care Planning:   Living will: No    Durable POA for healthcare: No    Advanced directive: No      PREVENTIVE SCREENINGS      Cardiovascular Screening:    General: Screening Current      Abdominal Aortic Aneurysm (AAA) Screening:    Risk factors include: age between 65-74 yo        Lung Cancer Screening:     General: Screening Not Indicated    Screening, Brief Intervention, and Referral to Treatment (SBIRT)    Screening  Typical number of drinks in a day: 3  Typical number of drinks in a week: 3  Interpretation: Low risk drinking behavior.    AUDIT-C Screenin) How often did you have a drink containing alcohol in the past year? 4 or more times a week  2) How many drinks did you have on a typical day when you were drinking in the past year? 7 to 9  3) How often did you have 6 or more drinks on one occasion in the past year? daily or almost daily    AUDIT-C Score: 8  Interpretation: Score 4-12 (male): POSITIVE screen for alcohol misuse    AUDIT Screenin) How often during the last year have you found that you were not able to stop drinking once you had started? 0 - never  5) How often during the last year have you failed to do what was normally expected from you because of drinking? 0 - never  6) How often during the last year have you needed a first drink in the morning to get yourself going after a heavy drinking session? 0 - never  7) How often during the last year have you had a feeling of guilt or remorse after drinking? 0 - never  8) How often during the last year  have you been unable to remember what happened the night before because you had been drinking? 0 - never  9) Have you or someone else been injured as a result of your drinking? 0 - no  10) Has a relative or friend or a doctor or another health worker been concerned about your drinking or suggested you cut down? 0 - no    AUDIT Score: 8  Interpretation: Harmful or hazardous alcohol consumption    Single Item Drug Screening:  How often have you used an illegal drug (including marijuana) or a prescription medication for non-medical reasons in the past year? never    Single Item Drug Screen Score: 0  Interpretation: Negative screen for possible drug use disorder    Social Determinants of Health     Financial Resource Strain: Low Risk  (9/5/2023)    Overall Financial Resource Strain (CARDIA)     Difficulty of Paying Living Expenses: Not very hard   Food Insecurity: No Food Insecurity (9/12/2024)    Hunger Vital Sign     Worried About Running Out of Food in the Last Year: Never true     Ran Out of Food in the Last Year: Never true   Transportation Needs: No Transportation Needs (9/12/2024)    PRAPARE - Transportation     Lack of Transportation (Medical): No     Lack of Transportation (Non-Medical): No   Housing Stability: Low Risk  (9/12/2024)    Housing Stability Vital Sign     Unable to Pay for Housing in the Last Year: No     Number of Times Moved in the Last Year: 0     Homeless in the Last Year: No   Utilities: Not At Risk (9/12/2024)    [facility] Utilities     Threatened with loss of utilities: No     No results found.    Objective     There were no vitals taken for this visit.    Physical Exam

## 2024-09-19 NOTE — PATIENT INSTRUCTIONS
Medicare wellness exam is completed.  Blood pressure is well-controlled.  Continue lisinopril HCT 10/12.5 once daily although prescription written for twice daily so we can get enough for his 4-month stay in [facility].  Immunizations are up-to-date as he recently had flu and COVID.  Had shingles a couple years ago.  Unsure about pneumonia vaccine but probably had it.  Will try to obtain results from his colonoscopy this about his most recent colonoscopy.  Recheck 1 year or as needed.    Medicare Preventive Visit Patient Instructions  Thank you for completing your Welcome to Medicare Visit or Medicare Annual Wellness Visit today. Your next wellness visit will be due in one year (9/20/2025).  The screening/preventive services that you may require over the next 5-10 years are detailed below. Some tests may not apply to you based off risk factors and/or age. Screening tests ordered at today's visit but not completed yet may show as past due. Also, please note that scanned in results may not display below.  Preventive Screenings:  Service Recommendations Previous Testing/Comments   Colorectal Cancer Screening  Colonoscopy    Fecal Occult Blood Test (FOBT)/Fecal Immunochemical Test (FIT)  Fecal DNA/Cologuard Test  Flexible Sigmoidoscopy Age: 45-75 years old   Colonoscopy: every 10 years (May be performed more frequently if at higher risk)  OR  FOBT/FIT: every 1 year  OR  Cologuard: every 3 years  OR  Sigmoidoscopy: every 5 years  Screening may be recommended earlier than age 45 if at higher risk for colorectal cancer. Also, an individualized decision between you and your healthcare provider will decide whether screening between the ages of 76-85 would be appropriate. Colonoscopy: Not on file  FOBT/FIT: Not on file  Cologuard: Not on file  Sigmoidoscopy: Not on file          Prostate Cancer Screening Individualized decision between patient and health care provider in men between ages of 55-69   Medicare will cover every 12  months beginning on the day after your 50th birthday PSA: No results in last 5 years           Hepatitis C Screening Once for adults born between 1945 and 1965  More frequently in patients at high risk for Hepatitis C Hep C Antibody: Not on file        Diabetes Screening 1-2 times per year if you're at risk for diabetes or have pre-diabetes Fasting glucose: 98 mg/dL (9/5/2023)  A1C: No results in last 5 years (No results in last 5 years)      Cholesterol Screening Once every 5 years if you don't have a lipid disorder. May order more often based on risk factors. Lipid panel: 09/05/2023  Screening Current      Other Preventive Screenings Covered by Medicare:  Abdominal Aortic Aneurysm (AAA) Screening: covered once if your at risk. You're considered to be at risk if you have a family history of AAA or a male between the age of 65-75 who smoking at least 100 cigarettes in your lifetime.  Lung Cancer Screening: covers low dose CT scan once per year if you meet all of the following conditions: (1) Age 55-77; (2) No signs or symptoms of lung cancer; (3) Current smoker or have quit smoking within the last 15 years; (4) You have a tobacco smoking history of at least 20 pack years (packs per day x number of years you smoked); (5) You get a written order from a healthcare provider.  Glaucoma Screening: covered annually if you're considered high risk: (1) You have diabetes OR (2) Family history of glaucoma OR (3)  aged 50 and older OR (4)  American aged 65 and older  Osteoporosis Screening: covered every 2 years if you meet one of the following conditions: (1) Have a vertebral abnormality; (2) On glucocorticoid therapy for more than 3 months; (3) Have primary hyperparathyroidism; (4) On osteoporosis medications and need to assess response to drug therapy.  HIV Screening: covered annually if you're between the age of 15-65. Also covered annually if you are younger than 15 and older than 65 with risk  factors for HIV infection. For pregnant patients, it is covered up to 3 times per pregnancy.    Immunizations:  Immunization Recommendations   Influenza Vaccine Annual influenza vaccination during flu season is recommended for all persons aged >= 6 months who do not have contraindications   Pneumococcal Vaccine   * Pneumococcal conjugate vaccine = PCV13 (Prevnar 13), PCV15 (Vaxneuvance), PCV20 (Prevnar 20)  * Pneumococcal polysaccharide vaccine = PPSV23 (Pneumovax) Adults 19-63 yo with certain risk factors or if 65+ yo  If never received any pneumonia vaccine: recommend Prevnar 20 (PCV20)  Give PCV20 if previously received 1 dose of PCV13 or PPSV23   Hepatitis B Vaccine 3 dose series if at intermediate or high risk (ex: diabetes, end stage renal disease, liver disease)   Respiratory syncytial virus (RSV) Vaccine - COVERED BY MEDICARE PART D  * RSVPreF3 (Arexvy) CDC recommends that adults 60 years of age and older may receive a single dose of RSV vaccine using shared clinical decision-making (SCDM)   Tetanus (Td) Vaccine - COST NOT COVERED BY MEDICARE PART B Following completion of primary series, a booster dose should be given every 10 years to maintain immunity against tetanus. Td may also be given as tetanus wound prophylaxis.   Tdap Vaccine - COST NOT COVERED BY MEDICARE PART B Recommended at least once for all adults. For pregnant patients, recommended with each pregnancy.   Shingles Vaccine (Shingrix) - COST NOT COVERED BY MEDICARE PART B  2 shot series recommended in those 19 years and older who have or will have weakened immune systems or those 50 years and older     Health Maintenance Due:      Topic Date Due    Hepatitis C Screening  Never done    Colorectal Cancer Screening  Never done     Immunizations Due:      Topic Date Due    Pneumococcal Vaccine: 65+ Years (1 of 1 - PCV) Never done    COVID-19 Vaccine (1 - 2023-24 season) Never done    Influenza Vaccine (1) 09/01/2024     Advance Directives   What  are advance directives?  Advance directives are legal documents that state your wishes and plans for medical care. These plans are made ahead of time in case you lose your ability to make decisions for yourself. Advance directives can apply to any medical decision, such as the treatments you want, and if you want to donate organs.   What are the types of advance directives?  There are many types of advance directives, and each state has rules about how to use them. You may choose a combination of any of the following:  Living will:  This is a written record of the treatment you want. You can also choose which treatments you do not want, which to limit, and which to stop at a certain time. This includes surgery, medicine, IV fluid, and tube feedings.   Durable power of  for healthcare (DPAHC):  This is a written record that states who you want to make healthcare choices for you when you are unable to make them for yourself. This person, called a proxy, is usually a family member or a friend. You may choose more than 1 proxy.  Do not resuscitate (DNR) order:  A DNR order is used in case your heart stops beating or you stop breathing. It is a request not to have certain forms of treatment, such as CPR. A DNR order may be included in other types of advance directives.  Medical directive:  This covers the care that you want if you are in a coma, near death, or unable to make decisions for yourself. You can list the treatments you want for each condition. Treatment may include pain medicine, surgery, blood transfusions, dialysis, IV or tube feedings, and a ventilator (breathing machine).  Values history:  This document has questions about your views, beliefs, and how you feel and think about life. This information can help others choose the care that you would choose.  Why are advance directives important?  An advance directive helps you control your care. Although spoken wishes may be used, it is better to have  your wishes written down. Spoken wishes can be misunderstood, or not followed. Treatments may be given even if you do not want them. An advance directive may make it easier for your family to make difficult choices about your care.   Weight Management   Why it is important to manage your weight:  Being overweight increases your risk of health conditions such as heart disease, high blood pressure, type 2 diabetes, and certain types of cancer. It can also increase your risk for osteoarthritis, sleep apnea, and other respiratory problems. Aim for a slow, steady weight loss. Even a small amount of weight loss can lower your risk of health problems.  How to lose weight safely:  A safe and healthy way to lose weight is to eat fewer calories and get regular exercise. You can lose up about 1 pound a week by decreasing the number of calories you eat by 500 calories each day.   Healthy meal plan for weight management:  A healthy meal plan includes a variety of foods, contains fewer calories, and helps you stay healthy. A healthy meal plan includes the following:  Eat whole-grain foods more often.  A healthy meal plan should contain fiber. Fiber is the part of grains, fruits, and vegetables that is not broken down by your body. Whole-grain foods are healthy and provide extra fiber in your diet. Some examples of whole-grain foods are whole-wheat breads and pastas, oatmeal, brown rice, and bulgur.  Eat a variety of vegetables every day.  Include dark, leafy greens such as spinach, kale, [place] greens, and mustard greens. Eat yellow and orange vegetables such as carrots, sweet potatoes, and winter squash.   Eat a variety of fruits every day.  Choose fresh or canned fruit (canned in its own juice or light syrup) instead of juice. Fruit juice has very little or no fiber.  Eat low-fat dairy foods.  Drink fat-free (skim) milk or 1% milk. Eat fat-free yogurt and low-fat cottage cheese. Try low-fat cheeses such as mozzarella and other  "reduced-fat cheeses.  Choose meat and other protein foods that are low in fat.  Choose beans or other legumes such as split peas or lentils. Choose fish, skinless poultry (chicken or turkey), or lean cuts of red meat (beef or pork). Before you cook meat or poultry, cut off any visible fat.   Use less fat and oil.  Try baking foods instead of frying them. Add less fat, such as margarine, sour cream, regular salad dressing and mayonnaise to foods. Eat fewer high-fat foods. Some examples of high-fat foods include french fries, doughnuts, ice cream, and cakes.  Eat fewer sweets.  Limit foods and drinks that are high in sugar. This includes candy, cookies, regular soda, and sweetened drinks.  Exercise:  Exercise at least 30 minutes per day on most days of the week. Some examples of exercise include walking, biking, dancing, and swimming. You can also fit in more physical activity by taking the stairs instead of the elevator or parking farther away from stores. Ask your healthcare provider about the best exercise plan for you.   Alcohol Use and Your Health    Drinking too much can harm your health.  Excessive alcohol use leads to about 88,000 death in the [place] each year, and shortens the life of those who diet by almost 30 years.  Further, excessive drinking cost the economy $249 billion in 2010.  Most excessive drinkers are not alcohol dependent.    Excessive alcohol use has immediate effects that increase the risk of many harmful health conditions.  These are most often the result of binge drinking.  Over time, excessive alcohol use can lead to the development of chronic diseases and other series health problems.    What is considered a \"drink\"?        Excessive alcohol use includes:  Binge Drinking: For women, 4 or more drinks consumed on one occasion. For men, 5 or more drinks consumed on one occasion.  Heavy Drinking: For women, 8 or more drinks per week. For men, 15 or more drinks per week  Any alcohol used " by pregnant women  Any alcohol used by those under the age of 21 years    If you choose to drink, do so in moderation:  Do not drink at all if you are under the age of 21, or if you are or may be pregnant, or have health problems that could be made worse by drinking.  For women, up to 1 drink per day  For men, up to 2 drinks a day    No one should begin drinking or drink more frequently based on potential health benefits    Short-Term Health Risks:  Injuries: motor vehicle crashes, falls, drownings, burns  Violence: homicide, suicide, sexual assault, intimate partner violence  Alcohol poisoning  Reproductive health: risky sexual behaviors, unintended prengnacy, sexually transmitted diseases, miscarriage, stillbirth, fetal alcohol syndrome    Long-Term Health Risks:  Chronic diseases: high blood pressure, heart disease, stroke, liver disease, digestive problems  Cancers: breast, mouth and throat, liver, colon  Learning and memory problems: dementia, poor school performance  Mental health: depression, anxiety, insomnia  Social problems: lost productivity, family problems, unemployment  Alcohol dependence    For support and more information:  Substance Abuse and Mental Health Services Administration  [address]  [place] [address]  Web Address: http://www.[facility] Hospital[facility].gov    Alcoholics Anonymous        Web Address: http://www.aa.org    https://www.cdc.gov/alcohol/fact-sheets/alcohol-use.htm     © Copyright [employer] 2018 Information is for End User's use only and may not be sold, redistributed or otherwise used for commercial purposes. All illustrations and images included in CareNotes® are the copyrighted property of [employer] or [employer]

## 2024-09-19 NOTE — PROGRESS NOTES
"Chief Complaint   Patient presents with    Medicare Wellness Visit    Follow-up        HPI   Here for Medicare wellness exam and follow-up of hypertension.  Has a fair amount of stress in his life.  Youngest daughter in alcohol detox for the fifth time.  Oldest son is sober and leads his AA group.  Middle child has some amphetamine issues.  None of the children live nearby.  Notes that both he and his wife have a family history of alcoholism.  Continues on 10 mg of lisinopril.  Blood pressure at home usually runs in the 130s over 80s.  Last year, lisinopril changed to lisinopril HCT.  Notes that he leaves for 4 months and has some difficulty getting enough blood pressure medication.      Past Medical History:   Diagnosis Date    Hypertension         Past Surgical History:   Procedure Laterality Date    VAGOTOMY AND PYLOROPLASTY N/A 1999    Done after multiple GI bleeds with good success       Social History     Tobacco Use    Smoking status: Never    Smokeless tobacco: Never   Substance Use Topics    Alcohol use: Yes     Comment: 3 glasses of wine a day       Social History     Social History Narrative     since 1987.  Second marriage.  Wife is 3 years older.    2 children from present marriage.  1 from first marriage.    2 children have alcohol issues and 1 methamphetamine.    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    Wife is an author.    Plays tennis 5 days a week.    Lives in [place] and has a vacation house in [facility] which became primary residence in [facility].  Goes to [facility] for 4 months every winter.        The following portions of the patient's history were reviewed and updated as appropriate: allergies, current medications, past family history, past medical history, past social history, past surgical history, and problem list.      Review of Systems       /60   Pulse 64   Temp 97.8 °F (36.6 °C)   Resp 18   Ht 6' 1\" (1.854 m)   Wt 95.3 kg (210 lb 2 oz)   SpO2 98%   " BMI 27.72 kg/m²      Physical Exam   Healthy appearing individual in no acute distress.  Extraocular motions are intact.  Both ear drums are white.  Hearing is grossly intact.  Throat reveals no erythema.  Teeth are in good repair.  No neck nodes or thyromegaly.  Lungs are clear.  Heart regular with no murmurs or gallops.  Abdomen is soft and nontender.  No leg edema.  Skin reveals no apparent rash.  Neurologic grossly within normal limits.  Normal mood and affect.  Musculoskeletal exam grossly within normal limits.                    Current Outpatient Medications:     finasteride (PROSCAR) 5 mg tablet, Take 1 tablet (5 mg total) by mouth daily, Disp: 90 tablet, Rfl: 0    lisinopril-hydrochlorothiazide (PRINZIDE,ZESTORETIC) 10-12.5 MG per tablet, Take 1 tablet by mouth 2 (two) times a day, Disp: 180 tablet, Rfl: 3     No problem-specific Assessment & Plan notes found for this encounter.       Diagnoses and all orders for this visit:    Medicare annual wellness visit, subsequent    Primary hypertension  -     lisinopril-hydrochlorothiazide (PRINZIDE,ZESTORETIC) 10-12.5 MG per tablet; Take 1 tablet by mouth 2 (two) times a day  -     Lipid panel; Future  -     Basic metabolic panel; Future        Patient Instructions   Medicare wellness exam is completed.  Blood pressure is well-controlled.  Continue lisinopril HCT 10/12.5 once daily although prescription written for twice daily so we can get enough for his 4-month stay in [facility].  Immunizations are up-to-date as he recently had flu and COVID.  Had shingles a couple years ago.  Unsure about pneumonia vaccine but probably had it.  Will try to obtain results from his colonoscopy this about his most recent colonoscopy.  Recheck 1 year or as needed.    Medicare Preventive Visit Patient Instructions  Thank you for completing your Welcome to Medicare Visit or Medicare Annual Wellness Visit today. Your next wellness visit will be due in one year (9/20/2025).  The  screening/preventive services that you may require over the next 5-10 years are detailed below. Some tests may not apply to you based off risk factors and/or age. Screening tests ordered at today's visit but not completed yet may show as past due. Also, please note that scanned in results may not display below.  Preventive Screenings:  Service Recommendations Previous Testing/Comments   Colorectal Cancer Screening  Colonoscopy    Fecal Occult Blood Test (FOBT)/Fecal Immunochemical Test (FIT)  Fecal DNA/Cologuard Test  Flexible Sigmoidoscopy Age: 45-75 years old   Colonoscopy: every 10 years (May be performed more frequently if at higher risk)  OR  FOBT/FIT: every 1 year  OR  Cologuard: every 3 years  OR  Sigmoidoscopy: every 5 years  Screening may be recommended earlier than age 45 if at higher risk for colorectal cancer. Also, an individualized decision between you and your healthcare provider will decide whether screening between the ages of 76-85 would be appropriate. Colonoscopy: Not on file  FOBT/FIT: Not on file  Cologuard: Not on file  Sigmoidoscopy: Not on file          Prostate Cancer Screening Individualized decision between patient and health care provider in men between ages of 55-69   Medicare will cover every 12 months beginning on the day after your 50th birthday PSA: No results in last 5 years           Hepatitis C Screening Once for adults born between 1945 and 1965  More frequently in patients at high risk for Hepatitis C Hep C Antibody: Not on file        Diabetes Screening 1-2 times per year if you're at risk for diabetes or have pre-diabetes Fasting glucose: 98 mg/dL (9/5/2023)  A1C: No results in last 5 years (No results in last 5 years)      Cholesterol Screening Once every 5 years if you don't have a lipid disorder. May order more often based on risk factors. Lipid panel: 09/05/2023  Screening Current      Other Preventive Screenings Covered by Medicare:  Abdominal Aortic Aneurysm (AAA)  Screening: covered once if your at risk. You're considered to be at risk if you have a family history of AAA or a male between the age of 65-75 who smoking at least 100 cigarettes in your lifetime.  Lung Cancer Screening: covers low dose CT scan once per year if you meet all of the following conditions: (1) Age 55-77; (2) No signs or symptoms of lung cancer; (3) Current smoker or have quit smoking within the last 15 years; (4) You have a tobacco smoking history of at least 20 pack years (packs per day x number of years you smoked); (5) You get a written order from a healthcare provider.  Glaucoma Screening: covered annually if you're considered high risk: (1) You have diabetes OR (2) Family history of glaucoma OR (3)  aged 50 and older OR (4)  American aged 65 and older  Osteoporosis Screening: covered every 2 years if you meet one of the following conditions: (1) Have a vertebral abnormality; (2) On glucocorticoid therapy for more than 3 months; (3) Have primary hyperparathyroidism; (4) On osteoporosis medications and need to assess response to drug therapy.  HIV Screening: covered annually if you're between the age of 15-65. Also covered annually if you are younger than 15 and older than 65 with risk factors for HIV infection. For pregnant patients, it is covered up to 3 times per pregnancy.    Immunizations:  Immunization Recommendations   Influenza Vaccine Annual influenza vaccination during flu season is recommended for all persons aged >= 6 months who do not have contraindications   Pneumococcal Vaccine   * Pneumococcal conjugate vaccine = PCV13 (Prevnar 13), PCV15 (Vaxneuvance), PCV20 (Prevnar 20)  * Pneumococcal polysaccharide vaccine = PPSV23 (Pneumovax) Adults 19-63 yo with certain risk factors or if 65+ yo  If never received any pneumonia vaccine: recommend Prevnar 20 (PCV20)  Give PCV20 if previously received 1 dose of PCV13 or PPSV23   Hepatitis B Vaccine 3 dose series if at  intermediate or high risk (ex: diabetes, end stage renal disease, liver disease)   Respiratory syncytial virus (RSV) Vaccine - COVERED BY MEDICARE PART D  * RSVPreF3 (Arexvy) CDC recommends that adults 60 years of age and older may receive a single dose of RSV vaccine using shared clinical decision-making (SCDM)   Tetanus (Td) Vaccine - COST NOT COVERED BY MEDICARE PART B Following completion of primary series, a booster dose should be given every 10 years to maintain immunity against tetanus. Td may also be given as tetanus wound prophylaxis.   Tdap Vaccine - COST NOT COVERED BY MEDICARE PART B Recommended at least once for all adults. For pregnant patients, recommended with each pregnancy.   Shingles Vaccine (Shingrix) - COST NOT COVERED BY MEDICARE PART B  2 shot series recommended in those 19 years and older who have or will have weakened immune systems or those 50 years and older     Health Maintenance Due:      Topic Date Due    Hepatitis C Screening  Never done    Colorectal Cancer Screening  Never done     Immunizations Due:      Topic Date Due    Pneumococcal Vaccine: 65+ Years (1 of 1 - PCV) Never done    COVID-19 Vaccine (1 - 2023-24 season) Never done    Influenza Vaccine (1) 09/01/2024     Advance Directives   What are advance directives?  Advance directives are legal documents that state your wishes and plans for medical care. These plans are made ahead of time in case you lose your ability to make decisions for yourself. Advance directives can apply to any medical decision, such as the treatments you want, and if you want to donate organs.   What are the types of advance directives?  There are many types of advance directives, and each state has rules about how to use them. You may choose a combination of any of the following:  Living will:  This is a written record of the treatment you want. You can also choose which treatments you do not want, which to limit, and which to stop at a certain time.  This includes surgery, medicine, IV fluid, and tube feedings.   Durable power of  for healthcare (DPAHC):  This is a written record that states who you want to make healthcare choices for you when you are unable to make them for yourself. This person, called a proxy, is usually a family member or a friend. You may choose more than 1 proxy.  Do not resuscitate (DNR) order:  A DNR order is used in case your heart stops beating or you stop breathing. It is a request not to have certain forms of treatment, such as CPR. A DNR order may be included in other types of advance directives.  Medical directive:  This covers the care that you want if you are in a coma, near death, or unable to make decisions for yourself. You can list the treatments you want for each condition. Treatment may include pain medicine, surgery, blood transfusions, dialysis, IV or tube feedings, and a ventilator (breathing machine).  Values history:  This document has questions about your views, beliefs, and how you feel and think about life. This information can help others choose the care that you would choose.  Why are advance directives important?  An advance directive helps you control your care. Although spoken wishes may be used, it is better to have your wishes written down. Spoken wishes can be misunderstood, or not followed. Treatments may be given even if you do not want them. An advance directive may make it easier for your family to make difficult choices about your care.   Weight Management   Why it is important to manage your weight:  Being overweight increases your risk of health conditions such as heart disease, high blood pressure, type 2 diabetes, and certain types of cancer. It can also increase your risk for osteoarthritis, sleep apnea, and other respiratory problems. Aim for a slow, steady weight loss. Even a small amount of weight loss can lower your risk of health problems.  How to lose weight safely:  A safe and  healthy way to lose weight is to eat fewer calories and get regular exercise. You can lose up about 1 pound a week by decreasing the number of calories you eat by 500 calories each day.   Healthy meal plan for weight management:  A healthy meal plan includes a variety of foods, contains fewer calories, and helps you stay healthy. A healthy meal plan includes the following:  Eat whole-grain foods more often.  A healthy meal plan should contain fiber. Fiber is the part of grains, fruits, and vegetables that is not broken down by your body. Whole-grain foods are healthy and provide extra fiber in your diet. Some examples of whole-grain foods are whole-wheat breads and pastas, oatmeal, brown rice, and bulgur.  Eat a variety of vegetables every day.  Include dark, leafy greens such as spinach, kale, [place] greens, and mustard greens. Eat yellow and orange vegetables such as carrots, sweet potatoes, and winter squash.   Eat a variety of fruits every day.  Choose fresh or canned fruit (canned in its own juice or light syrup) instead of juice. Fruit juice has very little or no fiber.  Eat low-fat dairy foods.  Drink fat-free (skim) milk or 1% milk. Eat fat-free yogurt and low-fat cottage cheese. Try low-fat cheeses such as mozzarella and other reduced-fat cheeses.  Choose meat and other protein foods that are low in fat.  Choose beans or other legumes such as split peas or lentils. Choose fish, skinless poultry (chicken or turkey), or lean cuts of red meat (beef or pork). Before you cook meat or poultry, cut off any visible fat.   Use less fat and oil.  Try baking foods instead of frying them. Add less fat, such as margarine, sour cream, regular salad dressing and mayonnaise to foods. Eat fewer high-fat foods. Some examples of high-fat foods include french fries, doughnuts, ice cream, and cakes.  Eat fewer sweets.  Limit foods and drinks that are high in sugar. This includes candy, cookies, regular soda, and sweetened  "drinks.  Exercise:  Exercise at least 30 minutes per day on most days of the week. Some examples of exercise include walking, biking, dancing, and swimming. You can also fit in more physical activity by taking the stairs instead of the elevator or parking farther away from stores. Ask your healthcare provider about the best exercise plan for you.   Alcohol Use and Your Health    Drinking too much can harm your health.  Excessive alcohol use leads to about 88,000 death in the [place] each year, and shortens the life of those who diet by almost 30 years.  Further, excessive drinking cost the economy $249 billion in 2010.  Most excessive drinkers are not alcohol dependent.    Excessive alcohol use has immediate effects that increase the risk of many harmful health conditions.  These are most often the result of binge drinking.  Over time, excessive alcohol use can lead to the development of chronic diseases and other series health problems.    What is considered a \"drink\"?        Excessive alcohol use includes:  Binge Drinking: For women, 4 or more drinks consumed on one occasion. For men, 5 or more drinks consumed on one occasion.  Heavy Drinking: For women, 8 or more drinks per week. For men, 15 or more drinks per week  Any alcohol used by pregnant women  Any alcohol used by those under the age of 21 years    If you choose to drink, do so in moderation:  Do not drink at all if you are under the age of 21, or if you are or may be pregnant, or have health problems that could be made worse by drinking.  For women, up to 1 drink per day  For men, up to 2 drinks a day    No one should begin drinking or drink more frequently based on potential health benefits    Short-Term Health Risks:  Injuries: motor vehicle crashes, falls, drownings, burns  Violence: homicide, suicide, sexual assault, intimate partner violence  Alcohol poisoning  Reproductive health: risky sexual behaviors, unintended prengnacy, sexually " transmitted diseases, miscarriage, stillbirth, fetal alcohol syndrome    Long-Term Health Risks:  Chronic diseases: high blood pressure, heart disease, stroke, liver disease, digestive problems  Cancers: breast, mouth and throat, liver, colon  Learning and memory problems: dementia, poor school performance  Mental health: depression, anxiety, insomnia  Social problems: lost productivity, family problems, unemployment  Alcohol dependence    For support and more information:  Substance Abuse and Mental Health Services Administration  [address]  [place] [address]  Web Address: http://www.[facility].gov    Alcoholics Anonymous        Web Address: http://www.aa.org    https://www.cdc.gov/alcohol/fact-sheets/alcohol-use.htm     © Copyright [employer] 2018 Information is for End User's use only and may not be sold, redistributed or otherwise used for commercial purposes. All illustrations and images included in CareNotes® are the copyrighted property of [employer] or [employer]       Answers submitted by the patient for this visit:  AUDIT (Alcohol Use Disorders Identification Test) Screening (Submitted on 9/12/2024)  How often during the last year have you found that you were not able to stop drinking once you had started?: 0 - never  How often during the last year have you failed to do what was normally expected from you because of drinking?: 0 - never  How often during the last year have you needed a first drink in the morning to get yourself going after a heavy drinking session?: 0 - never  How often during the last year have you had a feeling of guilt or remorse after drinking?: 0 - never  How often during the last year have you been unable to remember what happened the night before because you had been drinking?: 0 - never  Have you or someone else been injured as a result of your drinking?: 0 - no  Has a relative or friend or a doctor or another health worker been concerned about your drinking  or suggested you cut down?: 0 - no  Medicare Annual Wellness Visit (Submitted on 9/12/2024)  How would you rate your overall health?: excellent  Compared to last year, how is your physical health?: same  In general, how satisfied are you with your life?: very satisfied  Compared to last year, how is your eyesight?: slightly worse  Compared to last year, how is your hearing?: slightly worse  Compared to last year, how is your emotional/mental health?: same  How often is anger a problem for you?: never, rarely  How often do you feel unusually tired/fatigued?: never, rarely  In the past 7 days, how much pain have you experienced?: none  In the past 6 months, have you lost or gained 10 pounds without trying?: No  One or more falls in the last year: No  Do you have trouble with the stairs inside or outside your home?: No  Does your home have working smoke alarms?: Yes  Does your home have a carbon monoxide monitor?: Yes  Which safety hazards (if any) have you experienced in your home? Please select all that apply.: none  How would you describe your current diet? Please select all that apply.: Regular  In addition to prescription medications, are you taking any over-the-counter supplements?: Yes  If yes, what supplements are you taking?: Beet root  Can you manage your medications?: Yes  Are you currently taking any opioid medications?: No  Can you walk and transfer into and out of your bed and chair?: Yes  Can you dress and groom yourself?: Yes  Can you bathe or shower yourself?: Yes  Can you feed yourself?: Yes  Can you do your laundry/ housekeeping?: Yes  Can you manage your money, pay your bills, and track your expenses?: Yes  Can you make your own meals?: Yes  Can you do your own shopping?: Yes  Within the last 12 months, have you had any hospitalizations or Emergency Department visits?: No  Do you have a living will?: No  Do you have a Durable POA (Power of ) for healthcare decisions?: No  Do you have an  Advanced Directive for end of life decisions?: No  How often have you used an illegal drug (including marijuana) or a prescription medication for non-medical reasons in the past year?: never  What is the typical number of drinks you consume in a day?: 3  What is the typical number of drinks you consume in a week?: 3  How often did you have a drink containing alcohol in the past year?: 4 or more times a week  How many drinks did you have on a typical day  when you were drinking in the past year?: 7 to 9  How often did you have 6 or more drinks on one occasion in the past year?: daily or almost daily

## 2024-09-20 ENCOUNTER — TELEPHONE (OUTPATIENT)
Dept: ADMINISTRATIVE | Facility: OTHER | Age: 69
End: 2024-09-20

## 2024-09-20 NOTE — TELEPHONE ENCOUNTER
Upon review of the In Basket request and the patient's chart, initial outreach has been made via fax to facility. Please see Contacts section for details.     Thank you  Sara Lara MA

## 2024-09-20 NOTE — LETTER
Procedure Request Form: Colonoscopy      Date Requested: 24  Patient: Francis Cole  Patient : [id number]   Referring Provider: Dieter Perez MD        Date of Procedure ______________________________       The above patient has informed us that they have completed their   most recent Colonoscopy at your facility. Please complete   this form and attach all corresponding procedure reports/results.    Comments __Most recent  ________________________________________________________  ____________________________________________________________________  ____________________________________________________________________  ____________________________________________________________________    Facility Completing Procedure _________________________________________    Form Completed By (print name) _______________________________________      Signature __________________________________________________________      These reports are needed for  compliance.    Please fax this completed form and a copy of the procedure report to our office located at [address] [place] [address] as soon as possible to Fax [phone] liana Ruiz: Phone [phone]    We thank you for your assistance in treating our mutual patient.

## 2024-09-20 NOTE — TELEPHONE ENCOUNTER
----- Message from Dieter Perez MD sent at 9/19/2024 12:23 PM EDT -----  Please call [facility] endoscopy [phone] and get results of patient's last colonoscopy.

## 2024-09-27 NOTE — TELEPHONE ENCOUNTER
As a follow-up, a second attempt has been made for outreach via fax to facility. Please see Contacts section for details.    Thank you  Sara Lara MA

## 2024-10-01 LAB
APOB+LDLR+PCSK9 GENE MUT ANL BLD/T: NOT DETECTED
BRCA1+BRCA2 DEL+DUP + FULL MUT ANL BLD/T: NOT DETECTED
MLH1+MSH2+MSH6+PMS2 GN DEL+DUP+FUL M: NOT DETECTED

## 2024-10-02 NOTE — TELEPHONE ENCOUNTER
Upon review of the In Basket request we were able to locate, review, and update the patient chart as requested for CRC: Colonoscopy.    Any additional questions or concerns should be emailed to the Practice Liaisons via the appropriate education email address, please do not reply via In Basket.    Thank you  Sara Lara MA   [facility]

## 2024-10-02 NOTE — TELEPHONE ENCOUNTER
As a final attempt, a third outreach has been made via telephone call to facility. Please see Contacts section for details. This encounter will be closed and completed by end of day. Should we receive the requested information because of previous outreach attempts, the requested patient's chart will be updated appropriately. Spoke to Dr. Lackey's office they will fax over Colonoscopy     Thank you  Sara Lara MA

## 2024-11-20 ENCOUNTER — TELEPHONE (OUTPATIENT)
Age: 69
End: 2024-11-20

## 2024-12-06 ENCOUNTER — OFFICE VISIT (OUTPATIENT)
Dept: FAMILY MEDICINE CLINIC | Facility: CLINIC | Age: 69
End: 2024-12-06
Payer: MEDICARE

## 2024-12-06 ENCOUNTER — PROCEDURE VISIT (OUTPATIENT)
Dept: OBGYN CLINIC | Facility: OTHER | Age: 69
End: 2024-12-06
Payer: MEDICARE

## 2024-12-06 VITALS
OXYGEN SATURATION: 98 % | HEART RATE: 96 BPM | BODY MASS INDEX: 28.1 KG/M2 | DIASTOLIC BLOOD PRESSURE: 62 MMHG | WEIGHT: 212 LBS | HEIGHT: 73 IN | SYSTOLIC BLOOD PRESSURE: 136 MMHG | RESPIRATION RATE: 18 BRPM | TEMPERATURE: 97.6 F

## 2024-12-06 VITALS — BODY MASS INDEX: 27.83 KG/M2 | WEIGHT: 210 LBS | HEIGHT: 73 IN

## 2024-12-06 DIAGNOSIS — M17.0 PRIMARY OSTEOARTHRITIS OF BOTH KNEES: Primary | ICD-10-CM

## 2024-12-06 DIAGNOSIS — A09 TRAVELER'S DIARRHEA: Primary | ICD-10-CM

## 2024-12-06 PROCEDURE — 99213 OFFICE O/P EST LOW 20 MIN: CPT | Performed by: NURSE PRACTITIONER

## 2024-12-06 PROCEDURE — G2211 COMPLEX E/M VISIT ADD ON: HCPCS | Performed by: NURSE PRACTITIONER

## 2024-12-06 PROCEDURE — 99213 OFFICE O/P EST LOW 20 MIN: CPT | Performed by: FAMILY MEDICINE

## 2024-12-06 PROCEDURE — 20611 DRAIN/INJ JOINT/BURSA W/US: CPT | Performed by: FAMILY MEDICINE

## 2024-12-06 RX ORDER — AZITHROMYCIN 500 MG/1
500 TABLET, FILM COATED ORAL DAILY
Qty: 3 TABLET | Refills: 0 | Status: SHIPPED | OUTPATIENT
Start: 2024-12-06 | End: 2024-12-06 | Stop reason: ALTCHOICE

## 2024-12-06 RX ORDER — AZITHROMYCIN 500 MG/1
500 TABLET, FILM COATED ORAL DAILY
Qty: 3 TABLET | Refills: 0 | Status: SHIPPED | OUTPATIENT
Start: 2024-12-06 | End: 2024-12-09

## 2024-12-06 NOTE — PROGRESS NOTES
1. Primary osteoarthritis of both knees  Injection Procedure Prior Authorization          Orders Placed This Encounter   Procedures    Large joint arthrocentesis    Injection Procedure Prior Authorization        IMAGING STUDIES: (I personally reviewed images in PACS and report):  Xray knee bilateral 8/31/23:  Mild to mod PFOA, lateral OA                  ASSESSMENT/PLAN:  Bilateral Knee PFOA  Right Mild to Mod PFOA, Left mild PFOA      Repeat X-ray next visit: None    Return for Follow-up for Ultrasound Guided Injection.    Patient instructions below verbally summarized in person during encounter:  Patient Instructions   Trial of bilateral long-acting corticosteroid- Zilretta  Plan for viscosupplementation upon [place] from [place] residence [place]      __________________________________________________________________________    HISTORY OF PRESENT ILLNESS:    Visit 7/26/24:  Patient presents with knee pain. History, examination, and x-rays are consistent with diagnosis of Osteoarthritis.     Pain Score:   10/10  Pain from condition does interfere with activities of daily living  Previous Visco relief: significant relief for 4 months  Previous CSI relief: minimal to mild relief    Visit 12/6/24:  Patient presents with knee pain. History, examination, and x-rays are consistent with diagnosis of Osteoarthritis. He is planning [place].     plays tennnis almost daily    Pain Score:   moderate  Pain from condition does interfere with activities of daily living  Previous Visco relief: great relief  Previous CSI relief: minimal to mild relief traditional triamcinolone  Never received trial of zilretta in past        Review of Systems      Following history reviewed and update:    Past Medical History:   Diagnosis Date    Hypertension      Past Surgical History:   Procedure Laterality Date    VAGOTOMY AND PYLOROPLASTY N/A 1999    Done after multiple GI bleeds with good success  "    Social History   Social History     Substance and Sexual Activity   Alcohol Use Yes    Comment: 3 glasses of wine a day     Social History     Substance and Sexual Activity   Drug Use Not Currently     Social History     Tobacco Use   Smoking Status Never   Smokeless Tobacco Never     Family History   Problem Relation Age of Onset    Lung cancer Mother      No Known Allergies       Physical Exam  There were no vitals taken for this visit.        Ortho Exam  RIGHT KNEE:  Erythema: no  Swelling: no  Increased Warmth: no    LEFT KNEE:  Erythema: no  Swelling: no  Increased Warmth: no    __________________________________________________________________________  Large joint arthrocentesis: bilateral knee  Universal Protocol:  procedure performed by consultantConsent: Verbal consent obtained.  Risks and benefits: risks, benefits and alternatives were discussed  Consent given by: patient  Time out: Immediately prior to procedure a \"time out\" was called to verify the correct patient, procedure, equipment, support staff and site/side marked as required.  Patient understanding: patient states understanding of the procedure being performed  Site marked: the operative site was marked  Patient identity confirmed: verbally with patient  Supporting Documentation  Indications: pain   Procedure Details  Location: knee - bilateral knee  Preparation: Patient was prepped and draped in the usual sterile fashion (betadine if not allergic. alcohol before and after ethyl chloride spray)  Needle size: 22 G  Ultrasound guidance: yes  Approach: anterolateral    Medications (Right): 32 mg triamcinolone acetonide 32 MGMedications (Left): 32 mg triamcinolone acetonide 32 MG   Patient tolerance: patient tolerated the procedure well with no immediate complications  Dressing:  Sterile dressing applied                                "

## 2024-12-06 NOTE — PROGRESS NOTES
Name: Francis Cole      : 1955      MRN: [id number]  Encounter Provider: MENDEZ Lopez  Encounter Date: 2024   Encounter department: [facility]  :  Assessment & Plan  Traveler's diarrhea  Start azithromycin 500 mg once daily for 3 days.   If symptoms do not resolve with azithromycin, he will contact me and we will proceed with stool samples.     Orders:    azithromycin (ZITHROMAX) 500 MG tablet; Take 1 tablet (500 mg total) by mouth daily for 3 days           History of Present Illness     Francis Cole is a 69 year old male presenting today for diarrhea.     Has had diarrhea for 4 weeks now.   Traveled to the [facility] [place], diarrhea started right after he got home.   He also had cough, chest congestion, but this resolved.   Stools remain watery, worse with eating.   Has good appetite, no weight loss.   Frequency and urgency has decreased.     No abdominal pain.   Rumbling--no pain.   No fevers.   He is not waking from sleep to have a bowel movement.     He has no night sweats, body aches.   No nausea or vomiting.                                 Review of Systems   Constitutional: Negative.    HENT: Negative.     Respiratory: Negative.     Cardiovascular: Negative.    Gastrointestinal:  Positive for diarrhea. Negative for abdominal distention, abdominal pain, anal bleeding, blood in stool, constipation, nausea, rectal pain and vomiting.     Current Outpatient Medications on File Prior to Visit   Medication Sig Dispense Refill    finasteride (PROSCAR) 5 mg tablet Take 1 tablet (5 mg total) by mouth daily 90 tablet 0    lisinopril-hydrochlorothiazide (PRINZIDE,ZESTORETIC) 10-12.5 MG per tablet Take 1 tablet by mouth 2 (two) times a day 180 tablet 3     No current facility-administered medications on file prior to visit.         Objective   /62 (BP Location: Left arm, Patient Position: Sitting, Cuff Size: Large)   Pulse 96   Temp 97.6 °F (36.4 °C) (Temporal)   Resp 18   " Ht 6' 1\" (1.854 m)   Wt 96.2 kg (212 lb)   SpO2 98%   BMI 27.97 kg/m²      Physical Exam  Vitals and nursing note reviewed.   Constitutional:       General: He is not in acute distress.     Appearance: Normal appearance. He is not ill-appearing.   Cardiovascular:      Rate and Rhythm: Normal rate and regular rhythm.      Heart sounds: No murmur heard.  Pulmonary:      Effort: Pulmonary effort is normal. No respiratory distress.      Breath sounds: Normal breath sounds. No wheezing or rales.   Abdominal:      General: Bowel sounds are normal.      Palpations: Abdomen is soft.      Tenderness: There is no abdominal tenderness.      Comments: Midline surgical scar, diastasis rectus.    Neurological:      Mental Status: He is alert.   Psychiatric:         Mood and Affect: Mood normal.         "

## 2024-12-06 NOTE — PATIENT INSTRUCTIONS
Trial of bilateral long-acting corticosteroid- Zilretta  Plan for viscosupplementation upon return from winter residence [place]

## 2024-12-22 ENCOUNTER — PATIENT MESSAGE (OUTPATIENT)
Dept: FAMILY MEDICINE CLINIC | Facility: CLINIC | Age: 69
End: 2024-12-22

## 2024-12-22 DIAGNOSIS — L65.9 HAIR LOSS: ICD-10-CM

## 2024-12-24 RX ORDER — FINASTERIDE 5 MG/1
5 TABLET, FILM COATED ORAL DAILY
Qty: 90 TABLET | Refills: 3 | Status: SHIPPED | OUTPATIENT
Start: 2024-12-24

## 2025-04-09 ENCOUNTER — OFFICE VISIT (OUTPATIENT)
Dept: FAMILY MEDICINE CLINIC | Facility: CLINIC | Age: 70
End: 2025-04-09
Payer: MEDICARE

## 2025-04-09 VITALS
SYSTOLIC BLOOD PRESSURE: 140 MMHG | BODY MASS INDEX: 28.18 KG/M2 | TEMPERATURE: 97.6 F | HEIGHT: 73 IN | DIASTOLIC BLOOD PRESSURE: 90 MMHG | RESPIRATION RATE: 18 BRPM | WEIGHT: 212.6 LBS | OXYGEN SATURATION: 99 % | HEART RATE: 70 BPM

## 2025-04-09 DIAGNOSIS — I10 PRIMARY HYPERTENSION: Primary | ICD-10-CM

## 2025-04-09 DIAGNOSIS — R60.0 BILATERAL LEG EDEMA: ICD-10-CM

## 2025-04-09 PROCEDURE — 99214 OFFICE O/P EST MOD 30 MIN: CPT | Performed by: FAMILY MEDICINE

## 2025-04-09 PROCEDURE — G2211 COMPLEX E/M VISIT ADD ON: HCPCS | Performed by: FAMILY MEDICINE

## 2025-04-09 RX ORDER — TORSEMIDE 20 MG/1
20 TABLET ORAL DAILY
Qty: 30 TABLET | Refills: 5 | Status: SHIPPED | OUTPATIENT
Start: 2025-04-09

## 2025-04-09 NOTE — PATIENT INSTRUCTIONS
Bilateral leg swelling present much worse in the right leg.  Could be related to salt intake.  Of note, his weight is not more than 4 months ago.  Check chemistry.  Continue 1 tablet of lisinopril HCT.  Begin torsemide 20 mg once daily in the morning.  Recommend trying to cut back on salt intake.  Consider support stockings until the swelling goes away.  Recheck in 3 weeks.

## 2025-05-01 DIAGNOSIS — R60.0 BILATERAL LEG EDEMA: ICD-10-CM

## 2025-05-02 RX ORDER — TORSEMIDE 20 MG/1
20 TABLET ORAL DAILY
Qty: 90 TABLET | Refills: 1 | Status: SHIPPED | OUTPATIENT
Start: 2025-05-02

## 2025-05-16 ENCOUNTER — APPOINTMENT (OUTPATIENT)
Dept: LAB | Facility: HOSPITAL | Age: 70
End: 2025-05-16
Attending: FAMILY MEDICINE
Payer: MEDICARE

## 2025-05-16 ENCOUNTER — PROCEDURE VISIT (OUTPATIENT)
Dept: OBGYN CLINIC | Facility: OTHER | Age: 70
End: 2025-05-16
Payer: MEDICARE

## 2025-05-16 VITALS — WEIGHT: 212 LBS | BODY MASS INDEX: 28.1 KG/M2 | HEIGHT: 73 IN

## 2025-05-16 DIAGNOSIS — M71.21 SYNOVIAL CYST OF RIGHT POPLITEAL SPACE: ICD-10-CM

## 2025-05-16 DIAGNOSIS — M25.561 CHRONIC PAIN OF RIGHT KNEE: ICD-10-CM

## 2025-05-16 DIAGNOSIS — G89.29 CHRONIC PAIN OF RIGHT KNEE: ICD-10-CM

## 2025-05-16 DIAGNOSIS — M17.0 PRIMARY OSTEOARTHRITIS OF BOTH KNEES: Primary | ICD-10-CM

## 2025-05-16 LAB
APPEARANCE FLD: CLEAR
COLOR FLD: YELLOW
CRYSTALS SNV QL MICRO: NORMAL
EOSINOPHIL NFR SNV MANUAL: 1 %
HISTIOCYTES NFR SNV MANUAL: 37 %
LYMPHOCYTES # SNV MANUAL: 54 %
NEUTROPHILS NFR SNV MANUAL: 7 %
RBC # SNV MANUAL: 25 /UL (ref 0–10)
SITE: ABNORMAL
SYNOVIOCYTES NFR SNV: 1 %
TOTAL CELLS COUNTED SPEC: 100
WBC # FLD MANUAL: 374 /UL (ref 0–200)

## 2025-05-16 PROCEDURE — 87070 CULTURE OTHR SPECIMN AEROBIC: CPT | Performed by: FAMILY MEDICINE

## 2025-05-16 PROCEDURE — 89051 BODY FLUID CELL COUNT: CPT

## 2025-05-16 PROCEDURE — 89050 BODY FLUID CELL COUNT: CPT

## 2025-05-16 PROCEDURE — 87205 SMEAR GRAM STAIN: CPT | Performed by: FAMILY MEDICINE

## 2025-05-16 PROCEDURE — 89060 EXAM SYNOVIAL FLUID CRYSTALS: CPT

## 2025-05-16 PROCEDURE — 87476 LYME DIS DNA AMP PROBE: CPT

## 2025-05-16 PROCEDURE — 20611 DRAIN/INJ JOINT/BURSA W/US: CPT | Performed by: FAMILY MEDICINE

## 2025-05-16 RX ADMIN — LIDOCAINE HYDROCHLORIDE 4 ML: 10 INJECTION, SOLUTION INFILTRATION; PERINEURAL at 13:30

## 2025-05-16 NOTE — ASSESSMENT & PLAN NOTE
Orders:  •  Injection Procedure Prior Authorization; Future  •  Large joint arthrocentesis: L knee  •  hyaluronan (MONOVISC) injection 88 mg   (11/5) Hgb 8.6, Hct 25.8, BUN 26, Cr 3.28

## 2025-05-16 NOTE — PROGRESS NOTES
"Name: Francis Cole      : 1955      MRN: [id number]  Encounter Provider: Elieser Larsen III, DO  Encounter Date: 2025   Encounter department: [facility] [place]  :  Assessment & Plan  Chronic pain of right knee  Large baker's cyst  Mri r/o meniscal tear  Bilateral USG visco gel 25  Next visit plan for zilretta     Orders:  •  MRI knee right  wo contrast; Future    Synovial cyst of right popliteal space    Orders:  •  MRI knee right  wo contrast; Future  •  Synovial fluid white cell count w/ diff; Future  •  Synovial fluid, crystal; Future  •  Body fluid culture and Gram stain; Future  •  RBC count,Synovial Fluid; Future  •  Lyme disease, PCR; Future  •  Large joint arthrocentesis: R knee  •  Large joint arthrocentesis: R knee  •  lidocaine (XYLOCAINE) 1 % injection 4 mL  •  lidocaine (XYLOCAINE) 1 % injection 4 mL  •  hyaluronan (MONOVISC) injection 88 mg    Primary osteoarthritis of both knees    Orders:  •  Injection Procedure Prior Authorization; Future  •  Large joint arthrocentesis: L knee  •  hyaluronan (MONOVISC) injection 88 mg        History of Present Illness   HPI  Francis Cole is a 69 y.o. male who presents     Patient presents with knee pain. History, examination, and x-rays are consistent with diagnosis of Osteoarthritis.   Since last visit patient did develop Baker's cyst in the posterior right knee evaluated [place] in [facility].  Today on ultrasound evaluation we did confirm large Baker's cyst in the right posterior medial knee as well as joint effusion in the suprapatellar pouch.    Nonpharmacologic treatment: home exercise program  Pain Score:   10/10  Pain from condition does interfere with activities of daily living  Previous Visco relief: yes significant  Previous CSI relief: most relief with Zilretta 3+ months      Review of Systems       Objective   Ht 6' 1\" (1.854 m)   Wt 96.2 kg (212 lb)   BMI 27.97 kg/m²      Physical " Exam    RIGHT KNEE:  Erythema: no  Swelling: +  Increased Warmth: no  Flexion: intact  Extension: intact  Lachman's: negative  Drawer: negative  Varus laxity: negative  Valgus laxity: negative  [facility]: negative     LEFT KNEE:  Erythema: no  Swelling: no  Increased Warmth: no  Flexion: intact  Extension: intact  Lachman's: negative  Drawer: negative  Varus laxity: negative  Valgus laxity: negative  Steve: negative   ____________________________________________________________________    Return in about 6 weeks (around 6/27/2025).  ____________________________________________________________________    IMAGING STUDIES: (I personally reviewed images in PACS, and if available-report):         PAST REPORTS:  Xray knee bilateral 8/31/23:  Mild to mod PFOA, lateral OA                 MRI left knee 12/8/2023:     SUBCUTANEOUS TISSUES: Normal     JOINT EFFUSION: There is a small joint effusion.     BAKER'S CYST: Small partially ruptured Baker's cyst.     MENISCI: There is a small radial tear through the inner portion of the posterior horn of the medial meniscus adjacent to the posterior root with a small flap of meniscal tissue extending towards the intercondylar notch as seen on series 5, image 23. The   lateral meniscus is intact.     CRUCIATE LIGAMENTS: Intact.     EXTENSOR APPARATUS: There is distal quadriceps tendon enthesopathy at its patellar insertion. There is borderline patella viri.     COLLATERAL LIGAMENTS: Intact.     ARTICULAR SURFACES: Moderate patella cartilage thinning.     BONES: Normal.     MUSCULATURE:  Intact.     IMPRESSION:     Small radial tear posterior horn of the medial meniscus adjacent to the posterior root with a small flap of meniscal tissue extending towards intercondylar notch.     Quadriceps insertional tendinosis. Borderline patella viri.     Moderate patella cartilage thinning.     Small joint effusion and small partially ruptured Baker's  "cyst.  ____________________________________________________________________    Large joint arthrocentesis: L knee    Performed by: Elieser Larsen III, DO  Authorized by: Elieser Larsen III, DO    Universal Protocol:  procedure performed by consultantConsent: Verbal consent obtained  Risks and benefits: risks, benefits and alternatives were discussed  Consent given by: patient  Time out: Immediately prior to procedure a \"time out\" was called to verify the correct patient, procedure, equipment, support staff and site/side marked as required.  Patient understanding: patient states understanding of the procedure being performed  Site marked: the operative site was marked  Patient identity confirmed: verbally with patient  Supporting Documentation  Indications: pain     Is this a Visco injection? NoProcedure Details  Location: knee - L knee  Preparation: Patient was prepped and draped in the usual sterile fashion (betadine if not allergic. alcohol before and after ethyl chloride spray)  Needle size: 18 G  Ultrasound guidance: yes  Approach: anterolateral  Medications administered: 88 mg hyaluronan 88 MG/4ML    Patient tolerance: patient tolerated the procedure well with no immediate complications  Dressing:  Sterile dressing applied      Large joint arthrocentesis: R knee    Performed by: Elieser Larsen III, DO  Authorized by: Elieser Larsen III, DO    Universal Protocol:  Procedure performed by: (Tonny Dougherty DO)  Consent: Verbal consent obtained  Risks and benefits: risks, benefits and alternatives were discussed  Consent given by: patient  Time out: Immediately prior to procedure a \"time out\" was called to verify the correct patient, procedure, equipment, support staff and site/side marked as required.  Patient understanding: patient states understanding of the procedure being performed  Site marked: the operative site was marked  Patient identity confirmed: verbally with patient  Supporting " "Documentation  Indications: pain and diagnostic evaluation     Is this a Visco injection? NoProcedure Details  Location: knee - R knee  Preparation: Patient was prepped and draped in the usual sterile fashion  Needle size: 18 G  Ultrasound guidance: yes  Approach: anterolateral  Medications administered: 4 mL lidocaine 1 %; 88 mg hyaluronan 88 MG/4ML    Aspirate amount: 17 mL  Aspirate: serous and yellow  Patient tolerance: patient tolerated the procedure well with no immediate complications  Dressing:  Sterile dressing applied      Large joint arthrocentesis: R knee    Performed by: Elieser Larsen III, DO  Authorized by: Elieser Larsen III, DO    Universal Protocol:  Procedure performed by: (Tonny Dougherty DO)  Consent: Verbal consent obtained  Risks and benefits: risks, benefits and alternatives were discussed  Consent given by: patient  Time out: Immediately prior to procedure a \"time out\" was called to verify the correct patient, procedure, equipment, support staff and site/side marked as required.  Patient understanding: patient states understanding of the procedure being performed  Site marked: the operative site was marked  Patient identity confirmed: verbally with patient  Supporting Documentation  Indications: pain and diagnostic evaluation     Is this a Visco injection? NoProcedure Details  Location: knee - R knee (baker's cyst)  Preparation: Patient was prepped and draped in the usual sterile fashion  Needle size: 16 G  Ultrasound guidance: yes  Approach: posterior  Medications administered: 4 mL lidocaine 1 %    Aspirate amount: 27 mL  Aspirate: clear and yellow  Analysis: fluid sample sent for laboratory analysis  Patient tolerance: patient tolerated the procedure well with no immediate complications  Dressing:  Sterile dressing applied        ____________________________________________________________________    Past Medical History:   Diagnosis Date   • Hypertension      Past Surgical " History:   Procedure Laterality Date   • VAGOTOMY AND PYLOROPLASTY N/A     Done after multiple GI bleeds with good success     Social History   Social History     Substance and Sexual Activity   Alcohol Use Yes   • Alcohol/week: 14.0 standard drinks of alcohol   • Types: 14 Glasses of wine per week    Comment: 3 glasses of wine a day     Social History     Substance and Sexual Activity   Drug Use Not Currently     Tobacco Use History[1]  Family History   Problem Relation Age of Onset   • Lung cancer Mother      Allergies[2]  ____________________________________________________________________    Patient instructions below verbally summarized in person during encounter:  There are no Patient Instructions on file for this visit.                   [1]  Social History  Tobacco Use   Smoking Status Former   • Current packs/day: 0.00   • Average packs/day: 1 pack/day for 5.0 years (5.0 ttl pk-yrs)   • Types: Cigarettes   • Start date: 1966   • Quit date: 1971   • Years since quittin.8   Smokeless Tobacco Never   [2]  No Known Allergies

## 2025-05-17 RX ORDER — LIDOCAINE HYDROCHLORIDE 10 MG/ML
4 INJECTION, SOLUTION INFILTRATION; PERINEURAL
Status: COMPLETED | OUTPATIENT
Start: 2025-05-16 | End: 2025-05-16

## 2025-05-18 LAB
BACTERIA SPEC BFLD CULT: NO GROWTH
GRAM STN SPEC: NORMAL

## 2025-05-19 LAB
BACTERIA SPEC BFLD CULT: NO GROWTH
GRAM STN SPEC: NORMAL

## 2025-05-21 ENCOUNTER — TELEPHONE (OUTPATIENT)
Dept: OBGYN CLINIC | Facility: CLINIC | Age: 70
End: 2025-05-21

## 2025-05-21 ENCOUNTER — RESULTS FOLLOW-UP (OUTPATIENT)
Dept: OBGYN CLINIC | Facility: OTHER | Age: 70
End: 2025-05-21

## 2025-05-21 LAB — B BURGDOR DNA SPEC QL NAA+PROBE: NEGATIVE

## 2025-06-09 ENCOUNTER — TELEPHONE (OUTPATIENT)
Dept: OBGYN CLINIC | Facility: OTHER | Age: 70
End: 2025-06-09

## 2025-06-09 NOTE — TELEPHONE ENCOUNTER
Dr. Larsen, do you want Mr. Dennis appt around 6/27 to be for the Zilretta injections and MRI results or is the Zilretta to wait 3 months from Visco?

## 2025-06-15 ENCOUNTER — HOSPITAL ENCOUNTER (OUTPATIENT)
Dept: MRI IMAGING | Facility: HOSPITAL | Age: 70
Discharge: HOME | End: 2025-06-15
Attending: FAMILY MEDICINE
Payer: MEDICARE

## 2025-06-15 DIAGNOSIS — G89.29 CHRONIC PAIN OF RIGHT KNEE: ICD-10-CM

## 2025-06-15 DIAGNOSIS — M25.561 CHRONIC PAIN OF RIGHT KNEE: ICD-10-CM

## 2025-06-15 DIAGNOSIS — M71.21 SYNOVIAL CYST OF RIGHT POPLITEAL SPACE: ICD-10-CM

## 2025-06-15 PROCEDURE — 73721 MRI JNT OF LWR EXTRE W/O DYE: CPT

## 2025-07-09 NOTE — RESULT ENCOUNTER NOTE
sent to Yelena perez team to make sure insurance will pay since we will be seeing him prior to the 30 day interval

## 2025-07-18 ENCOUNTER — PROCEDURE VISIT (OUTPATIENT)
Dept: OBGYN CLINIC | Facility: OTHER | Age: 70
End: 2025-07-18
Payer: MEDICARE

## 2025-07-18 ENCOUNTER — APPOINTMENT (OUTPATIENT)
Dept: RADIOLOGY | Facility: OTHER | Age: 70
End: 2025-07-18
Attending: FAMILY MEDICINE
Payer: MEDICARE

## 2025-07-18 DIAGNOSIS — M17.0 PRIMARY OSTEOARTHRITIS OF BOTH KNEES: Primary | ICD-10-CM

## 2025-07-18 DIAGNOSIS — M17.10 PATELLOFEMORAL ARTHRITIS: ICD-10-CM

## 2025-07-18 DIAGNOSIS — M75.42 IMPINGEMENT SYNDROME OF LEFT SHOULDER: ICD-10-CM

## 2025-07-18 DIAGNOSIS — M75.02 ADHESIVE CAPSULITIS OF LEFT SHOULDER: ICD-10-CM

## 2025-07-18 DIAGNOSIS — S49.92XA INJURY OF LEFT SHOULDER, INITIAL ENCOUNTER: ICD-10-CM

## 2025-07-18 DIAGNOSIS — M76.891 TENDINITIS OF RIGHT QUADRICEPS TENDON: ICD-10-CM

## 2025-07-18 DIAGNOSIS — M71.21 BAKER'S CYST OF KNEE, RIGHT: ICD-10-CM

## 2025-07-18 PROCEDURE — 20611 DRAIN/INJ JOINT/BURSA W/US: CPT | Performed by: FAMILY MEDICINE

## 2025-07-18 PROCEDURE — 73030 X-RAY EXAM OF SHOULDER: CPT

## 2025-07-18 PROCEDURE — 99214 OFFICE O/P EST MOD 30 MIN: CPT | Performed by: FAMILY MEDICINE

## 2025-07-18 PROCEDURE — 20610 DRAIN/INJ JOINT/BURSA W/O US: CPT | Performed by: FAMILY MEDICINE

## 2025-07-18 RX ORDER — LIDOCAINE HYDROCHLORIDE 10 MG/ML
3 INJECTION, SOLUTION INFILTRATION; PERINEURAL
Status: COMPLETED | OUTPATIENT
Start: 2025-07-18 | End: 2025-07-18

## 2025-07-18 RX ORDER — TRIAMCINOLONE ACETONIDE 40 MG/ML
40 INJECTION, SUSPENSION INTRA-ARTICULAR; INTRAMUSCULAR
Status: COMPLETED | OUTPATIENT
Start: 2025-07-18 | End: 2025-07-18

## 2025-07-18 RX ORDER — LIDOCAINE HYDROCHLORIDE 10 MG/ML
4 INJECTION, SOLUTION INFILTRATION; PERINEURAL
Status: COMPLETED | OUTPATIENT
Start: 2025-07-18 | End: 2025-07-18

## 2025-07-18 RX ADMIN — LIDOCAINE HYDROCHLORIDE 4 ML: 10 INJECTION, SOLUTION INFILTRATION; PERINEURAL at 13:30

## 2025-07-18 RX ADMIN — LIDOCAINE HYDROCHLORIDE 3 ML: 10 INJECTION, SOLUTION INFILTRATION; PERINEURAL at 13:30

## 2025-07-18 RX ADMIN — TRIAMCINOLONE ACETONIDE 40 MG: 40 INJECTION, SUSPENSION INTRA-ARTICULAR; INTRAMUSCULAR at 13:30

## 2025-07-18 NOTE — PROGRESS NOTES
Name: Francis Cole      : 1955      MRN: [id number]  Encounter Provider: Elieser Larsen III, DO  Encounter Date: 2025   Encounter department: [facility]  :  Assessment & Plan  Primary osteoarthritis of both knees  Moderate to Severe Right Knee PFOA   Right Knee Baker's cyst    Moderate Left knee PFOA  Left knee radial tear medial meniscus    Aspiration baker's cyst 25  USG bilateral Zilretta 25    Plan for viscogel 2025  Plan for Zilretta prior to leaving for [place] 2025    Orders:  •  Large joint arthrocentesis: L knee  •  Large joint arthrocentesis: R knee  •  Injection Procedure Prior Authorization; Future  •  triamcinolone acetonide (ZILRETTA) injection 32 mg  •  triamcinolone acetonide (ZILRETTA) injection 32 mg    Baker's cyst of knee, right    Orders:  •  Large joint arthrocentesis: L knee  •  Large joint arthrocentesis: R knee  •  Large joint arthrocentesis: R knee  •  triamcinolone acetonide (ZILRETTA) injection 32 mg  •  triamcinolone acetonide (ZILRETTA) injection 32 mg  •  lidocaine (XYLOCAINE) 1 % injection 3 mL    Injury of left shoulder, initial encounter    Orders:  •  Injection Procedure Prior Authorization; Future  •  XR shoulder 2+ vw left; Future  •  Large joint arthrocentesis: L subacromial bursa  •  lidocaine (XYLOCAINE) 1 % injection 4 mL  •  triamcinolone acetonide (Kenalog-40) 40 mg/mL injection 40 mg    Impingement syndrome of left shoulder    Orders:  •  Ambulatory referral to Physical Therapy; Future    Patellofemoral arthritis    Orders:  •  Ambulatory referral to Physical Therapy; Future    Tendinitis of right quadriceps tendon    Orders:  •  Ambulatory referral to Physical Therapy; Future        History of Present Illness   HPI  Francis Cole is a 69 y.o. male who presents     25:  Patient presents with knee pain. History, examination, and x-rays are consistent with diagnosis of  Osteoarthritis.   Since last visit patient did develop Baker's cyst in the posterior right knee evaluated [place] in [facility].  Today on ultrasound evaluation we did confirm large Baker's cyst in the right posterior medial knee as well as joint effusion in the suprapatellar pouch.    Nonpharmacologic treatment: home exercise program  Pain Score:   10/10  Pain from condition does interfere with activities of daily living  Previous Visco relief: yes significant  Previous CSI relief: most relief with Zilretta 3+ months    7/18/25:  Patient presents with knee pain. History, examination, and x-rays are consistent with diagnosis of Osteoarthritis.   This is a patient MRI of the right knee which did confirm patellofemoral arthritis moderate to severe as well as Baker's cyst.  Left knee mild to moderate patellofemoral arthritis on previous MRI      Nonpharmacologic treatment: home exercise program  Pain Score:   4-7/10  Pain from condition does interfere with activities of daily living  Previous Visco relief: yes  Previous CSI relief: yes significant with Zilretta 4+ months    In addition patient also complains of left shoulder pain.  He tells me that last year he did fall onto his left shoulder with pain lasting almost 1 year but finally resolved.  Unfortunately his pain recurred without traumatic event.  Leiva test reproduces chief complaint of pain.  Rotator cuff intact.      Review of Systems       Objective   There were no vitals taken for this visit.     Physical Exam    RIGHT KNEE:  Erythema: no  Swelling: no  Increased Warmth: no  Tenderness:+lateral quadriceps  Flexion: seated at 90  Extension: intact  Drawer: negative  Varus laxity: negative  Valgus laxity: negative  +patellar grind    LEFT KNEE:  Erythema: no  Swelling: no  Increased Warmth: no  Tenderness: none  Flexion: seated at 90  Extension: intact  Drawer: negative  Varus laxity: negative  Valgus laxity: negative  Neg patellar grind    LEFT  SHOULDER:  Erythema: no  Swelling: no  Increased Warmth: no    ROM  Touchdown sign: intact  External Rotation: intact  Internal Rotation: intact    Strength  Abduction: 5/5  ER: 5/5  IR: 5/5    Drop-Arm: negative  Emptycan: negative    Leiva: ++      RIGHT SHOULDER:  Strength  Abduction: 5/5  ER: 5/5  IR: 5/5    ROM  Touchdown sign: intact    Empty can: negative    _______________________________________________    Return in about 3 months (around 10/18/2025) for Follow-up for Ultrasound Guided Injection.  ____________________________________________________________________    IMAGING STUDIES: (I personally reviewed images in PACS, and if available-report):  X-ray left shoulder 7/18/2025:  Mild AC arthritis.  No significant glenohumeral arthritis.  No acute osseous O'Eliu.      MRI Right Knee 6/15/25:  Moderate to severe PFOA  Baker's cyst  No significant medial and lateral compartment OA    Report:  Large popliteal cyst.     Grade 4 chondrosis of the patella. 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     Moderate joint effusion         PAST REPORTS:  Xray knee bilateral 8/31/23:  Mild to mod PFOA, lateral OA                 MRI left knee 12/8/2023:     SUBCUTANEOUS TISSUES: Normal     JOINT EFFUSION: There is a small joint effusion.     BAKER'S CYST: Small partially ruptured Baker's cyst.     MENISCI: There is a small radial tear through the inner portion of the posterior horn of the medial meniscus adjacent to the posterior root with a small flap of meniscal tissue extending towards the intercondylar notch as seen on series 5, image 23. The   lateral meniscus is intact.     CRUCIATE LIGAMENTS: Intact.     EXTENSOR APPARATUS: There is distal quadriceps tendon enthesopathy at its patellar insertion. There is borderline patella viri.     COLLATERAL LIGAMENTS: Intact.     ARTICULAR SURFACES: Moderate patella cartilage thinning.     BONES: Normal.     MUSCULATURE:  Intact.     IMPRESSION:     Small radial tear  "posterior horn of the medial meniscus adjacent to the posterior root with a small flap of meniscal tissue extending towards intercondylar notch.     Quadriceps insertional tendinosis. Borderline patella viri.     Moderate patella cartilage thinning.     Small joint effusion and small partially ruptured Baker's cyst.  ____________________________________________________________________    Large joint arthrocentesis: L knee    Performed by: Elieser Larsen III, DO  Authorized by: Elieser Larsen III, DO    Universal Protocol:  procedure performed by consultantConsent: Verbal consent obtained  Risks and benefits: risks, benefits and alternatives were discussed  Consent given by: patient  Time out: Immediately prior to procedure a \"time out\" was called to verify the correct patient, procedure, equipment, support staff and site/side marked as required.  Patient understanding: patient states understanding of the procedure being performed  Site marked: the operative site was marked  Patient identity confirmed: verbally with patient  Supporting Documentation  Indications: pain     Is this a Visco injection? NoProcedure Details  Location: knee - L knee  Preparation: Patient was prepped and draped in the usual sterile fashion (betadine if not allergic. alcohol before and after ethyl chloride spray)  Needle size: 22 G  Ultrasound guidance: yes  Approach: anterolateral  Medications administered: 32 mg triamcinolone acetonide 32 MG    Patient tolerance: patient tolerated the procedure well with no immediate complications  Dressing:  Sterile dressing applied      Large joint arthrocentesis: R knee    Performed by: Elieser Larsen III, DO  Authorized by: Elieser Larsen III, DO    Universal Protocol:  Procedure performed by: (Tonny Dougheryt DO)  Consent: Verbal consent obtained  Risks and benefits: risks, benefits and alternatives were discussed  Consent given by: patient  Time out: Immediately prior to procedure " "a \"time out\" was called to verify the correct patient, procedure, equipment, support staff and site/side marked as required.  Patient understanding: patient states understanding of the procedure being performed  Site marked: the operative site was marked  Patient identity confirmed: verbally with patient  Supporting Documentation  Indications: pain and diagnostic evaluation     Is this a Visco injection? NoProcedure Details  Location: knee - R knee  Preparation: Patient was prepped and draped in the usual sterile fashion  Needle size: 18 G  Ultrasound guidance: yes  Approach: anterolateral  Medications administered: 32 mg triamcinolone acetonide 32 MG    Aspirate amount: 0 mL  Patient tolerance: patient tolerated the procedure well with no immediate complications  Dressing:  Sterile dressing applied      Large joint arthrocentesis: L subacromial bursa    Performed by: Elieser Larsen III, DO  Authorized by: Elieser Larsen III, DO    Universal Protocol:  procedure performed by consultantConsent: Verbal consent obtained  Risks and benefits: risks, benefits and alternatives were discussed  Consent given by: patient  Time out: Immediately prior to procedure a \"time out\" was called to verify the correct patient, procedure, equipment, support staff and site/side marked as required.  Patient understanding: patient states understanding of the procedure being performed  Site marked: the operative site was marked  Patient identity confirmed: verbally with patient  Supporting Documentation  Indications: pain and diagnostic evaluation     Is this a Visco injection? NoProcedure Details  Location: shoulder - L subacromial bursa  Preparation: Patient was prepped and draped in the usual sterile fashion  Needle size: 22 G  Ultrasound guidance: no  Approach: posterolateral  Medications administered: 4 mL lidocaine 1 %; 40 mg triamcinolone acetonide 40 mg/mL    Patient tolerance: patient tolerated the procedure well with no " "immediate complications  Dressing:  Sterile dressing applied      Large joint arthrocentesis: R knee    Performed by: Elieser Larsen III, DO  Authorized by: Elieser Larsen III, DO    Universal Protocol:  procedure performed by consultantConsent: Verbal consent obtained  Risks and benefits: risks, benefits and alternatives were discussed  Consent given by: patient  Time out: Immediately prior to procedure a \"time out\" was called to verify the correct patient, procedure, equipment, support staff and site/side marked as required.  Patient understanding: patient states understanding of the procedure being performed  Site marked: the operative site was marked  Patient identity confirmed: verbally with patient  Supporting Documentation  Indications: pain and diagnostic evaluation     Is this a Visco injection? NoProcedure Details  Location: knee - R knee  Preparation: Patient was prepped and draped in the usual sterile fashion  Needle size: 22 G  Ultrasound guidance: yes  Approach: posterior  Medications administered: 3 mL lidocaine 1 %    Aspirate amount: 25 mL  Aspirate: clear and yellow  Patient tolerance: patient tolerated the procedure well with no immediate complications  Dressing:  Sterile dressing applied    Baker's cyst aspiration                      ____________________________________________________________________    Past Medical History:   Diagnosis Date   • Hypertension      Past Surgical History:   Procedure Laterality Date   • VAGOTOMY AND PYLOROPLASTY N/A 1999    Done after multiple GI bleeds with good success     Social History   Social History     Substance and Sexual Activity   Alcohol Use Yes   • Alcohol/week: 14.0 standard drinks of alcohol   • Types: 14 Glasses of wine per week    Comment: 3 glasses of wine a day     Social History     Substance and Sexual Activity   Drug Use Not Currently     Tobacco Use History[1]  Family History   Problem Relation Name Age of Onset   • Lung cancer " Mother       Allergies[1]  ____________________________________________________________________    Patient instructions below verbally summarized in person during encounter:  There are no Patient Instructions on file for this visit.                       [1]  Social History  Tobacco Use   Smoking Status Former   • Current packs/day: 0.00   • Average packs/day: 1 pack/day for 5.0 years (5.0 ttl pk-yrs)   • Types: Cigarettes   • Start date: 1966   • Quit date: 1971   • Years since quittin.0   Smokeless Tobacco Never   [1]  No Known Allergies

## 2025-07-18 NOTE — ASSESSMENT & PLAN NOTE
Moderate to Severe Right Knee PFOA   Right Knee Baker's cyst    Moderate Left knee PFOA  Left knee radial tear medial meniscus    Aspiration baker's cyst 7/18/25  USG bilateral Zilretta 7/18/25    Plan for viscogel October 2025  Plan for Zilretta prior to leaving for [facility] December 2025    Orders:  •  Large joint arthrocentesis: L knee  •  Large joint arthrocentesis: R knee  •  Injection Procedure Prior Authorization; Future  •  triamcinolone acetonide (ZILRETTA) injection 32 mg  •  triamcinolone acetonide (ZILRETTA) injection 32 mg

## 2025-07-29 ENCOUNTER — TELEPHONE (OUTPATIENT)
Dept: OBGYN CLINIC | Facility: OTHER | Age: 70
End: 2025-07-29

## 2025-07-29 DIAGNOSIS — M17.0 PRIMARY OSTEOARTHRITIS OF BOTH KNEES: Primary | ICD-10-CM
